# Patient Record
Sex: MALE | Race: OTHER | HISPANIC OR LATINO | Employment: FULL TIME | URBAN - METROPOLITAN AREA
[De-identification: names, ages, dates, MRNs, and addresses within clinical notes are randomized per-mention and may not be internally consistent; named-entity substitution may affect disease eponyms.]

---

## 2017-07-25 ENCOUNTER — OFFICE VISIT (OUTPATIENT)
Dept: MEDICAL GROUP | Facility: MEDICAL CENTER | Age: 37
End: 2017-07-25
Payer: COMMERCIAL

## 2017-07-25 ENCOUNTER — TELEPHONE (OUTPATIENT)
Dept: MEDICAL GROUP | Facility: MEDICAL CENTER | Age: 37
End: 2017-07-25

## 2017-07-25 VITALS
TEMPERATURE: 97.2 F | OXYGEN SATURATION: 96 % | BODY MASS INDEX: 31.44 KG/M2 | HEIGHT: 74 IN | RESPIRATION RATE: 16 BRPM | WEIGHT: 245 LBS | HEART RATE: 74 BPM | SYSTOLIC BLOOD PRESSURE: 136 MMHG | DIASTOLIC BLOOD PRESSURE: 72 MMHG

## 2017-07-25 DIAGNOSIS — E66.9 OBESITY (BMI 30-39.9): ICD-10-CM

## 2017-07-25 DIAGNOSIS — K42.9 UMBILICAL HERNIA WITHOUT OBSTRUCTION AND WITHOUT GANGRENE: ICD-10-CM

## 2017-07-25 DIAGNOSIS — Z00.00 ROUTINE GENERAL MEDICAL EXAMINATION AT A HEALTH CARE FACILITY: ICD-10-CM

## 2017-07-25 DIAGNOSIS — F33.41 RECURRENT MAJOR DEPRESSIVE DISORDER, IN PARTIAL REMISSION (HCC): ICD-10-CM

## 2017-07-25 DIAGNOSIS — E78.5 DYSLIPIDEMIA: ICD-10-CM

## 2017-07-25 DIAGNOSIS — Z11.4 SCREENING FOR HIV (HUMAN IMMUNODEFICIENCY VIRUS): ICD-10-CM

## 2017-07-25 PROCEDURE — 99395 PREV VISIT EST AGE 18-39: CPT | Performed by: NURSE PRACTITIONER

## 2017-07-25 RX ORDER — FENOFIBRATE 120 MG/1
1 TABLET ORAL DAILY
Qty: 30 TAB | Refills: 11 | Status: CANCELLED | OUTPATIENT
Start: 2017-07-25

## 2017-07-25 ASSESSMENT — PATIENT HEALTH QUESTIONNAIRE - PHQ9: CLINICAL INTERPRETATION OF PHQ2 SCORE: 0

## 2017-07-25 ASSESSMENT — ENCOUNTER SYMPTOMS: DEPRESSION: 1

## 2017-07-25 NOTE — MR AVS SNAPSHOT
"        Bertin Dominique   2017 10:40 AM   Office Visit   MRN: [id number]    Department:  [address] [place] [address]   Dept Phone:  [phone]    Description:  Male : 1980   Provider:  LUI Cooley           Reason for Visit     Annual Exam would like to have lab order to check for HIV      Allergies as of 2017     No Known Allergies      You were diagnosed with     Routine general medical examination at a health care facility   [V70.0.ICD-9-CM]       Screening for HIV (human immunodeficiency virus)   [316808]       Dyslipidemia   [880522]       Obesity (BMI 30-39.9)   [997097]       Umbilical hernia without obstruction and without gangrene   [0063886]       Recurrent major depressive disorder, in partial remission (CMS-[facility])   [6752292]         Vital Signs     Blood Pressure Pulse Temperature Respirations Height Weight    136/72 mmHg 74 36.2 °C (97.2 °F) 16 1.88 m (6' 2\") 111.131 kg (245 lb)    Body Mass Index Oxygen Saturation Smoking Status             31.44 kg/m2 96% Current Every Day Smoker         Basic Information     Date Of Birth Sex Race Ethnicity Preferred Language    1980 Male  or   Origin (Kinyarwanda,Norwegian,Bahraini,Edgardo, etc) English      Problem List              ICD-10-CM Priority Class Noted - Resolved    Allergic rhinitis J30.9   2009 - Present    Tobacco use disorder F17.200   2009 - Present    Dyslipidemia E78.5   2011 - Present    Vitamin D deficiency disease E55.9   2013 - Present    Umbilical hernia K42.9   2013 - Present    Obesity (BMI 30-39.9) E66.9   2017 - Present    Recurrent major depressive disorder, in partial remission (CMS-[facility]) F33.41   2017 - Present      Health Maintenance        Date Due Completion Dates    IMM DTaP/Tdap/Td Vaccine (1 - Tdap) 10/11/1997 ---    IMM INFLUENZA (1) 2017, 2013            Current Immunizations     Influenza TIV (IM) 2015, 2013      "   Below and/or attached are the medications your provider expects you to take. Review all of your home medications and newly ordered medications with your provider and/or pharmacist. Follow medication instructions as directed by your provider and/or pharmacist. Please keep your medication list with you and share with your provider. Update the information when medications are discontinued, doses are changed, or new medications (including over-the-counter products) are added; and carry medication information at all times in the event of emergency situations     Allergies:  No Known Allergies          Medications  Valid as of: July 25, 2017 - 11:03 AM    Generic Name Brand Name Tablet Size Instructions for use    Cholecalciferol (Cap) Vitamin D 2000 UNITS Take  by mouth.        Sildenafil Citrate (Tab) VIAGRA 50 MG Take 1 Tab by mouth as needed for Erectile Dysfunction.        .                 Medicines prescribed today were sent to:     [facility] [address] [place] [address], [place] - [address] [facility]    [address] [facility] [address]    Phone: [phone] Fax: [phone]    Open 24 Hours?: No      Medication refill instructions:       If your prescription bottle indicates you have medication refills left, it is not necessary to call your provider’s office. Please contact your pharmacy and they will refill your medication.    If your prescription bottle indicates you do not have any refills left, you may request refills at any time through one of the following ways: The online [employer] system (except Urgent Care), by calling your provider’s office, or by asking your pharmacy to contact your provider’s office with a refill request. Medication refills are processed only during regular business hours and may not be available until the next business day. Your provider may request additional information or to have a follow-up visit with you prior to refilling your medication.   *Please Note: Medication refills  are assigned a new Rx number when refilled electronically. Your pharmacy may indicate that no refills were authorized even though a new prescription for the same medication is available at the pharmacy. Please request the medicine by name with the pharmacy before contacting your provider for a refill.        Your To Do List     Future Labs/Procedures Complete By Expires    COMP METABOLIC PANEL  As directed 7/26/2018    HIV ANTIBODIES  As directed 7/25/2018    LIPID PROFILE  As directed 7/26/2018      Referral     A referral request has been sent to our patient care coordination department. Please allow 3-5 business days for us to process this request and contact you either by phone or mail. If you do not hear from us by the 5th business day, please call us at [phone].           [facility] Access Code: Activation code not generated  Current [facility] Status: Active          Quit Tobacco Information     Do you want to quit using tobacco?    Quitting tobacco decreases risks of cancer, heart and lung disease, increases life expectancy, improves sense of taste and smell, and increases spending money, among other benefits.    If you are thinking about quitting, we can help.  • [facility] Quit Tobacco Program: 733.502.5752  o Program occurs weekly for four weeks and includes pharmacist consultation on products to support quitting smoking or chewing tobacco. A provider referral is needed for pharmacist consultation.  • Tobacco Users Help Hotline: 8-221-QUIT-NOW (080-6981) or https://[place].quitlogix.org/  o Free, confidential telephone and online coaching for [place] residents. Sessions are designed on a schedule that is convenient for you. Eligible clients receive free nicotine replacement therapy.  • Nationally: www.smokefree.gov  o Information and professional assistance to support both immediate and long-term needs as you become, and remain, a non-smoker. Smokefree.gov allows you to choose the help that best fits your  needs.
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
## 2017-07-27 ENCOUNTER — HOSPITAL ENCOUNTER (OUTPATIENT)
Dept: LAB | Facility: MEDICAL CENTER | Age: 37
End: 2017-07-27
Attending: NURSE PRACTITIONER
Payer: COMMERCIAL

## 2017-07-27 DIAGNOSIS — E78.5 DYSLIPIDEMIA: ICD-10-CM

## 2017-07-27 DIAGNOSIS — Z00.00 ROUTINE GENERAL MEDICAL EXAMINATION AT A HEALTH CARE FACILITY: ICD-10-CM

## 2017-07-27 DIAGNOSIS — Z11.4 SCREENING FOR HIV (HUMAN IMMUNODEFICIENCY VIRUS): ICD-10-CM

## 2017-07-27 LAB
ALBUMIN SERPL BCP-MCNC: 4 G/DL (ref 3.2–4.9)
ALBUMIN/GLOB SERPL: 1.3 G/DL
ALP SERPL-CCNC: 79 U/L (ref 30–99)
ALT SERPL-CCNC: 28 U/L (ref 2–50)
ANION GAP SERPL CALC-SCNC: 9 MMOL/L (ref 0–11.9)
AST SERPL-CCNC: 22 U/L (ref 12–45)
BILIRUB SERPL-MCNC: 0.4 MG/DL (ref 0.1–1.5)
BUN SERPL-MCNC: 21 MG/DL (ref 8–22)
CALCIUM SERPL-MCNC: 9 MG/DL (ref 8.5–10.5)
CHLORIDE SERPL-SCNC: 105 MMOL/L (ref 96–112)
CHOLEST SERPL-MCNC: 157 MG/DL (ref 100–199)
CO2 SERPL-SCNC: 26 MMOL/L (ref 20–33)
CREAT SERPL-MCNC: 0.97 MG/DL (ref 0.5–1.4)
GFR SERPL CREATININE-BSD FRML MDRD: >60 ML/MIN/1.73 M 2
GLOBULIN SER CALC-MCNC: 3.1 G/DL (ref 1.9–3.5)
GLUCOSE SERPL-MCNC: 92 MG/DL (ref 65–99)
HDLC SERPL-MCNC: 33 MG/DL
HIV 1+2 AB+HIV1 P24 AG SERPL QL IA: NON REACTIVE
LDLC SERPL CALC-MCNC: ABNORMAL MG/DL
POTASSIUM SERPL-SCNC: 3.5 MMOL/L (ref 3.6–5.5)
PROT SERPL-MCNC: 7.1 G/DL (ref 6–8.2)
SODIUM SERPL-SCNC: 140 MMOL/L (ref 135–145)
TRIGL SERPL-MCNC: 581 MG/DL (ref 0–149)

## 2017-07-27 PROCEDURE — 80053 COMPREHEN METABOLIC PANEL: CPT

## 2017-07-27 PROCEDURE — 36415 COLL VENOUS BLD VENIPUNCTURE: CPT

## 2017-07-27 PROCEDURE — 80061 LIPID PANEL: CPT

## 2017-07-27 PROCEDURE — 87389 HIV-1 AG W/HIV-1&-2 AB AG IA: CPT

## 2018-06-08 ENCOUNTER — OFFICE VISIT (OUTPATIENT)
Dept: MEDICAL GROUP | Facility: MEDICAL CENTER | Age: 38
End: 2018-06-08
Payer: OTHER MISCELLANEOUS

## 2018-06-08 VITALS
HEART RATE: 82 BPM | SYSTOLIC BLOOD PRESSURE: 118 MMHG | HEIGHT: 74 IN | BODY MASS INDEX: 33.37 KG/M2 | RESPIRATION RATE: 16 BRPM | DIASTOLIC BLOOD PRESSURE: 74 MMHG | OXYGEN SATURATION: 96 % | WEIGHT: 260 LBS

## 2018-06-08 DIAGNOSIS — E78.5 DYSLIPIDEMIA: ICD-10-CM

## 2018-06-08 DIAGNOSIS — Z11.4 SCREENING FOR HIV (HUMAN IMMUNODEFICIENCY VIRUS): ICD-10-CM

## 2018-06-08 DIAGNOSIS — Z00.00 ROUTINE GENERAL MEDICAL EXAMINATION AT A HEALTH CARE FACILITY: ICD-10-CM

## 2018-06-08 PROCEDURE — 99213 OFFICE O/P EST LOW 20 MIN: CPT | Performed by: NURSE PRACTITIONER

## 2018-06-08 RX ORDER — ATORVASTATIN CALCIUM 20 MG/1
20 TABLET, FILM COATED ORAL DAILY
Qty: 30 TAB | Refills: 11 | Status: SHIPPED | OUTPATIENT
Start: 2018-06-08 | End: 2019-03-05
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
## 2018-08-13 ENCOUNTER — NON-PROVIDER VISIT (OUTPATIENT)
Dept: URGENT CARE | Facility: PHYSICIAN GROUP | Age: 38
End: 2018-08-13

## 2018-08-13 DIAGNOSIS — Z02.1 PRE-EMPLOYMENT DRUG SCREENING: ICD-10-CM

## 2018-08-13 LAB
AMP AMPHETAMINE: NORMAL
COC COCAINE: NORMAL
INT CON NEG: NORMAL
INT CON POS: NORMAL
MET METHAMPHETAMINES: NORMAL
OPI OPIATES: NORMAL
PCP PHENCYCLIDINE: NORMAL
POC DRUG COMMENT 753798-OCCUPATIONAL HEALTH: NEGATIVE
THC: NORMAL

## 2018-08-13 PROCEDURE — 80305 DRUG TEST PRSMV DIR OPT OBS: CPT | Performed by: PHYSICIAN ASSISTANT

## 2018-11-05 ENCOUNTER — OFFICE VISIT (OUTPATIENT)
Dept: MEDICAL GROUP | Facility: MEDICAL CENTER | Age: 38
End: 2018-11-05
Payer: OTHER MISCELLANEOUS

## 2018-11-05 VITALS
HEART RATE: 71 BPM | DIASTOLIC BLOOD PRESSURE: 70 MMHG | WEIGHT: 262 LBS | OXYGEN SATURATION: 96 % | TEMPERATURE: 97.8 F | SYSTOLIC BLOOD PRESSURE: 142 MMHG | RESPIRATION RATE: 16 BRPM | BODY MASS INDEX: 33.62 KG/M2 | HEIGHT: 74 IN

## 2018-11-05 DIAGNOSIS — M54.50 ACUTE RIGHT-SIDED LOW BACK PAIN WITHOUT SCIATICA: ICD-10-CM

## 2018-11-05 DIAGNOSIS — E78.5 DYSLIPIDEMIA: ICD-10-CM

## 2018-11-05 PROCEDURE — 99214 OFFICE O/P EST MOD 30 MIN: CPT | Performed by: NURSE PRACTITIONER

## 2018-11-05 RX ORDER — DICLOFENAC SODIUM 75 MG/1
75 TABLET, DELAYED RELEASE ORAL 2 TIMES DAILY
Qty: 60 TAB | Refills: 0 | Status: SHIPPED | OUTPATIENT
Start: 2018-11-05 | End: 2019-03-05

## 2018-11-05 RX ORDER — TIZANIDINE 4 MG/1
4 TABLET ORAL 2 TIMES DAILY
Qty: 60 TAB | Refills: 0 | Status: SHIPPED | OUTPATIENT
Start: 2018-11-05 | End: 2019-03-05

## 2018-11-05 ASSESSMENT — ENCOUNTER SYMPTOMS: BACK PAIN: 1
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
## 2019-03-01 ENCOUNTER — HOSPITAL ENCOUNTER (OUTPATIENT)
Dept: LAB | Facility: MEDICAL CENTER | Age: 39
End: 2019-03-01
Attending: NURSE PRACTITIONER
Payer: COMMERCIAL

## 2019-03-01 DIAGNOSIS — Z00.00 ROUTINE GENERAL MEDICAL EXAMINATION AT A HEALTH CARE FACILITY: ICD-10-CM

## 2019-03-01 DIAGNOSIS — Z11.4 SCREENING FOR HIV (HUMAN IMMUNODEFICIENCY VIRUS): ICD-10-CM

## 2019-03-01 LAB
ALBUMIN SERPL BCP-MCNC: 4.1 G/DL (ref 3.2–4.9)
ALBUMIN/GLOB SERPL: 1.4 G/DL
ALP SERPL-CCNC: 67 U/L (ref 30–99)
ALT SERPL-CCNC: 33 U/L (ref 2–50)
ANION GAP SERPL CALC-SCNC: 7 MMOL/L (ref 0–11.9)
AST SERPL-CCNC: 25 U/L (ref 12–45)
BASOPHILS # BLD AUTO: 0.8 % (ref 0–1.8)
BASOPHILS # BLD: 0.05 K/UL (ref 0–0.12)
BILIRUB SERPL-MCNC: 0.8 MG/DL (ref 0.1–1.5)
BUN SERPL-MCNC: 23 MG/DL (ref 8–22)
CALCIUM SERPL-MCNC: 9 MG/DL (ref 8.5–10.5)
CHLORIDE SERPL-SCNC: 105 MMOL/L (ref 96–112)
CHOLEST SERPL-MCNC: 170 MG/DL (ref 100–199)
CO2 SERPL-SCNC: 28 MMOL/L (ref 20–33)
CREAT SERPL-MCNC: 1.12 MG/DL (ref 0.5–1.4)
EOSINOPHIL # BLD AUTO: 0.16 K/UL (ref 0–0.51)
EOSINOPHIL NFR BLD: 2.4 % (ref 0–6.9)
ERYTHROCYTE [DISTWIDTH] IN BLOOD BY AUTOMATED COUNT: 41.4 FL (ref 35.9–50)
FASTING STATUS PATIENT QL REPORTED: NORMAL
GLOBULIN SER CALC-MCNC: 3 G/DL (ref 1.9–3.5)
GLUCOSE SERPL-MCNC: 108 MG/DL (ref 65–99)
HCT VFR BLD AUTO: 49.4 % (ref 42–52)
HDLC SERPL-MCNC: 35 MG/DL
HGB BLD-MCNC: 16.7 G/DL (ref 14–18)
HIV 1+2 AB+HIV1 P24 AG SERPL QL IA: NON REACTIVE
IMM GRANULOCYTES # BLD AUTO: 0.01 K/UL (ref 0–0.11)
IMM GRANULOCYTES NFR BLD AUTO: 0.2 % (ref 0–0.9)
LDLC SERPL CALC-MCNC: ABNORMAL MG/DL
LYMPHOCYTES # BLD AUTO: 2.5 K/UL (ref 1–4.8)
LYMPHOCYTES NFR BLD: 37.6 % (ref 22–41)
MCH RBC QN AUTO: 30.3 PG (ref 27–33)
MCHC RBC AUTO-ENTMCNC: 33.8 G/DL (ref 33.7–35.3)
MCV RBC AUTO: 89.5 FL (ref 81.4–97.8)
MONOCYTES # BLD AUTO: 0.57 K/UL (ref 0–0.85)
MONOCYTES NFR BLD AUTO: 8.6 % (ref 0–13.4)
NEUTROPHILS # BLD AUTO: 3.36 K/UL (ref 1.82–7.42)
NEUTROPHILS NFR BLD: 50.4 % (ref 44–72)
NRBC # BLD AUTO: 0 K/UL
NRBC BLD-RTO: 0 /100 WBC
PLATELET # BLD AUTO: 211 K/UL (ref 164–446)
PMV BLD AUTO: 10.5 FL (ref 9–12.9)
POTASSIUM SERPL-SCNC: 3.9 MMOL/L (ref 3.6–5.5)
PROT SERPL-MCNC: 7.1 G/DL (ref 6–8.2)
RBC # BLD AUTO: 5.52 M/UL (ref 4.7–6.1)
SODIUM SERPL-SCNC: 140 MMOL/L (ref 135–145)
TRIGL SERPL-MCNC: 579 MG/DL (ref 0–149)
TSH SERPL DL<=0.005 MIU/L-ACNC: 2.04 UIU/ML (ref 0.38–5.33)
WBC # BLD AUTO: 6.7 K/UL (ref 4.8–10.8)

## 2019-03-01 PROCEDURE — 85025 COMPLETE CBC W/AUTO DIFF WBC: CPT

## 2019-03-01 PROCEDURE — 36415 COLL VENOUS BLD VENIPUNCTURE: CPT

## 2019-03-01 PROCEDURE — 80061 LIPID PANEL: CPT

## 2019-03-01 PROCEDURE — 84443 ASSAY THYROID STIM HORMONE: CPT

## 2019-03-01 PROCEDURE — 87389 HIV-1 AG W/HIV-1&-2 AB AG IA: CPT

## 2019-03-01 PROCEDURE — 80053 COMPREHEN METABOLIC PANEL: CPT

## 2019-03-05 ENCOUNTER — OFFICE VISIT (OUTPATIENT)
Dept: MEDICAL GROUP | Facility: MEDICAL CENTER | Age: 39
End: 2019-03-05
Payer: COMMERCIAL

## 2019-03-05 VITALS
BODY MASS INDEX: 33.75 KG/M2 | SYSTOLIC BLOOD PRESSURE: 152 MMHG | WEIGHT: 263 LBS | HEIGHT: 74 IN | TEMPERATURE: 97.3 F | OXYGEN SATURATION: 95 % | DIASTOLIC BLOOD PRESSURE: 86 MMHG | RESPIRATION RATE: 16 BRPM | HEART RATE: 72 BPM

## 2019-03-05 DIAGNOSIS — F33.41 RECURRENT MAJOR DEPRESSIVE DISORDER, IN PARTIAL REMISSION (HCC): ICD-10-CM

## 2019-03-05 DIAGNOSIS — R06.83 SNORING: ICD-10-CM

## 2019-03-05 DIAGNOSIS — I10 ESSENTIAL HYPERTENSION: ICD-10-CM

## 2019-03-05 DIAGNOSIS — E78.5 DYSLIPIDEMIA: ICD-10-CM

## 2019-03-05 DIAGNOSIS — R73.01 IMPAIRED FASTING GLUCOSE: ICD-10-CM

## 2019-03-05 DIAGNOSIS — Z23 NEED FOR TDAP VACCINATION: ICD-10-CM

## 2019-03-05 DIAGNOSIS — K21.9 GASTROESOPHAGEAL REFLUX DISEASE WITHOUT ESOPHAGITIS: ICD-10-CM

## 2019-03-05 LAB
HBA1C MFR BLD: 5.8 % (ref ?–5.8)
INT CON NEG: NEGATIVE
INT CON POS: POSITIVE

## 2019-03-05 PROCEDURE — 83036 HEMOGLOBIN GLYCOSYLATED A1C: CPT | Performed by: NURSE PRACTITIONER

## 2019-03-05 PROCEDURE — 90715 TDAP VACCINE 7 YRS/> IM: CPT | Performed by: NURSE PRACTITIONER

## 2019-03-05 PROCEDURE — 90471 IMMUNIZATION ADMIN: CPT | Performed by: NURSE PRACTITIONER

## 2019-03-05 PROCEDURE — 99214 OFFICE O/P EST MOD 30 MIN: CPT | Mod: 25 | Performed by: NURSE PRACTITIONER

## 2019-03-05 RX ORDER — ATORVASTATIN CALCIUM 40 MG/1
40 TABLET, FILM COATED ORAL DAILY
Qty: 30 TAB | Refills: 11 | Status: SHIPPED | OUTPATIENT
Start: 2019-03-05 | End: 2020-03-18 | Stop reason: SDUPTHER

## 2019-03-05 RX ORDER — OMEPRAZOLE 20 MG/1
20 CAPSULE, DELAYED RELEASE ORAL DAILY
Qty: 30 CAP | Refills: 0 | Status: SHIPPED | OUTPATIENT
Start: 2019-03-05 | End: 2021-01-08

## 2019-03-05 ASSESSMENT — ENCOUNTER SYMPTOMS
INSOMNIA: 1
DEPRESSION: 1
HEARTBURN: 1
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
## 2019-07-10 DIAGNOSIS — I10 ESSENTIAL HYPERTENSION: ICD-10-CM
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
## 2019-07-29 ENCOUNTER — SLEEP CENTER VISIT (OUTPATIENT)
Dept: SLEEP MEDICINE | Facility: MEDICAL CENTER | Age: 39
End: 2019-07-29
Payer: COMMERCIAL

## 2019-07-29 VITALS
RESPIRATION RATE: 16 BRPM | WEIGHT: 259 LBS | BODY MASS INDEX: 33.24 KG/M2 | DIASTOLIC BLOOD PRESSURE: 80 MMHG | HEART RATE: 73 BPM | SYSTOLIC BLOOD PRESSURE: 118 MMHG | OXYGEN SATURATION: 94 % | HEIGHT: 74 IN

## 2019-07-29 DIAGNOSIS — F17.200 TOBACCO USE DISORDER: ICD-10-CM

## 2019-07-29 DIAGNOSIS — R73.01 IMPAIRED FASTING GLUCOSE: ICD-10-CM

## 2019-07-29 DIAGNOSIS — E66.9 OBESITY (BMI 30-39.9): ICD-10-CM

## 2019-07-29 DIAGNOSIS — K21.9 GASTROESOPHAGEAL REFLUX DISEASE, ESOPHAGITIS PRESENCE NOT SPECIFIED: ICD-10-CM

## 2019-07-29 DIAGNOSIS — G47.33 OSA (OBSTRUCTIVE SLEEP APNEA): ICD-10-CM

## 2019-07-29 DIAGNOSIS — E78.5 DYSLIPIDEMIA: ICD-10-CM

## 2019-07-29 DIAGNOSIS — R06.83 SNORING: ICD-10-CM

## 2019-07-29 DIAGNOSIS — I10 HYPERTENSION, UNSPECIFIED TYPE: ICD-10-CM

## 2019-07-29 PROCEDURE — 99204 OFFICE O/P NEW MOD 45 MIN: CPT | Performed by: INTERNAL MEDICINE

## 2019-07-29 RX ORDER — ATORVASTATIN CALCIUM 20 MG/1
TABLET, FILM COATED ORAL
Refills: 11 | COMMUNITY
Start: 2019-05-09 | End: 2019-07-28

## 2019-07-29 RX ORDER — CHLORHEXIDINE GLUCONATE ORAL RINSE 1.2 MG/ML
SOLUTION DENTAL
Refills: 2 | COMMUNITY
Start: 2019-06-11 | End: 2019-07-28

## 2019-07-29 RX ORDER — ZOLPIDEM TARTRATE 5 MG/1
5 TABLET ORAL NIGHTLY PRN
Qty: 3 TAB | Refills: 0 | Status: SHIPPED | OUTPATIENT
Start: 2019-07-29 | End: 2019-08-29

## 2019-08-29 ENCOUNTER — SLEEP STUDY (OUTPATIENT)
Dept: SLEEP MEDICINE | Facility: MEDICAL CENTER | Age: 39
End: 2019-08-29
Attending: INTERNAL MEDICINE
Payer: COMMERCIAL

## 2019-08-29 DIAGNOSIS — G47.33 OSA (OBSTRUCTIVE SLEEP APNEA): ICD-10-CM

## 2019-08-29 PROCEDURE — 95811 POLYSOM 6/>YRS CPAP 4/> PARM: CPT | Performed by: FAMILY MEDICINE

## 2019-08-30 NOTE — PROCEDURES
Technical summary: The patient underwent a split-night polysomnogram. This was a 16 channel montage study to include a 6 channel EEG, a 2 channel EOG, and chin EMG, left and right leg EMG, a snore channel, a nasal pressure transducer, and a nasal oral airflow  thermistor and a CFLOW pressure transducer.   Respiratory effort was assessed with the use of a thoracic and abdominal monitor and overnight oximetry was obtained. Audio and video recordings were reviewed. This was a fully attended study and sleep stage scoring was performed. The test was technically adequate.    Scoring Criteria: A modification of the the AASM Manual for the Scoring of Sleep and Associated Events, 2012, was used.   Obstructive apnea was scored by cessation of airflow for at least 10 seconds with continuing respiratory effort.  Central apnea was scored by cessation of airflow for at least 10 seconds with no effort.  Hypopnea was scored by a 30% or more reduction in airflow for at least 10 seconds accompanied by an arterial oxygen desaturation of 3% or more.  (For Medicare patients, hypopneas were scored by a 30% or more reduction in airflow for at least 10 seconds accompanied by an arterial oxygen saturation of 4% or more, as required by their insurance, CMS.    Interpretation:  Study start time was 07:57:45 PM.  Total recording time was 6h 9.0m (369 minutes) with a total sleep time of 3h 22.5m (202 minutes) resulting in a sleep efficiency of 54.88%.  Sleep latency from the start fo the study was 88 minutes minutes and REM latency from sleep onset was 136 minutes minutes. Sleep stages showed increased SL, decreased SE, decreased REM, no N3 sleep and increased WASO of 78 min.    Respiratory:   There were 0 apneas in total consisting of 0 obstructive apneas, 0 mixed apneas, and 0 central apneas.  There were 51 hypopneas in total.The apnea index was 0.00 per hour and the hypopnea index was 15.11 per hour.The overall AHI was 15.1, with a REM AHI  of 8.57, and a supine AHI of 18.77.    Limb Movements:  There were a total of 0 periodic leg movements, of which 0 were PLMS arousals.  This resulted in a PLMS index of 0.0 and a PLMS arousal index of 0.0  Oximetry:  The mean SaO2 was 91.0% for the diagnostic portion of the study, with a minimum SaO2 of 83.0%.    Treatment:  Interpretation:  Treatment recording time was 3h 43.5m (223 minutes) with a total sleep time of 3h 27.0m (207 minutes) resulting in a sleep efficiency of 92.6%.    Sleep latency from the start of treatment was 03 minutes minutes and REM latency from sleep onset was 0h 44.0m minutes.  The patient had 41 arousals in total for an arousal index of 11.9. Sleep stages showed improved SE, normal REM, no N3 sleep and normal WASO of 13 min.    Respiratory:   There were 0 apneas in total consisting of  0 obstructive apneas, 0 central apneas, and 0 mixed apneas for an apnea index of 0.00.  The patient had 13 hypopneas in total, which resulted in a hypopnea index of 3.77.  The overall AHI was 3.77, with a REM AHI of 7.23, and a supine AHI of 3.99.       Limb Movements:  There were a total of 16 periodic leg movements, of which 3 were PLMS arousals.  This resulted in a PLMS index of 4.6 and a PLMS arousal index of 0.9.    Oximetry:  The mean SaO2 during treatment was 92.0%, with a minimum oxygen saturation of 86.0%.     CPAP was tried from 5 to 9cm H2O.    CPAP Titration:  Due to the significant number of obstructive respiratory events observed during the diagnostic portion of the study a CPAP titration trial was performed during the second half of the night. The CPAP pressure was initiated at 5 cm of water and the pressure was increased in an attempt to eliminate all sleep disordered breathing and snoring. The CPAP pressure was increased to CPAP 9 cm water and at this final pressure the patient was observed in the supine position and in the REM sleep stage. The apnea hypopnea index improved to 3.09/hr with  improved O2 sara of  86%. He spent 0.4 min of sleep time below 89% O2 saturation Snoring was resolved. The patient utilized medium simplus mask with heated humidification. The CPAP was well-tolerated and there was minimal air leaks. No supplemental oxygen was required.    Impression:  1.  Moderate obstructive sleep apnea with AHI of 15.1/hr and O2 sara 85 %. Due to severity of the disease he met the split study protocol. The titration started with CPAP 5 cm and the best tolerated was CPAP 9 cm. The AHI improved to 3.09/hr with improved O2 sara of 86% and average O2 saturation of 93 %.       Recommendations:  I recommend CPAP 9 cm with simplus mask. I also recommend 30 day compliance download to assess the efficacy to the recommended pressure, measure leak, apnea hypopnea index and compliance for further outpatient monitoring and management of CPAP therapy. In some cases alternative treatment options may prove effective in resolving sleep apnea and these options include upper airway surgery, the use of a dental orthotic or weight loss and positional therapy. Clinical correlation is required. In general patients with sleep apnea are advised to avoid alcohol and sedatives and to not operate a motor vehicle while drowsy and are at a greater risk for cardiovascular disease.

## 2019-09-18 ENCOUNTER — SLEEP CENTER VISIT (OUTPATIENT)
Dept: SLEEP MEDICINE | Facility: MEDICAL CENTER | Age: 39
End: 2019-09-18
Payer: COMMERCIAL

## 2019-09-18 VITALS
BODY MASS INDEX: 32.73 KG/M2 | WEIGHT: 255 LBS | DIASTOLIC BLOOD PRESSURE: 64 MMHG | HEIGHT: 74 IN | OXYGEN SATURATION: 95 % | SYSTOLIC BLOOD PRESSURE: 122 MMHG | HEART RATE: 59 BPM

## 2019-09-18 DIAGNOSIS — G47.33 OSA (OBSTRUCTIVE SLEEP APNEA): ICD-10-CM

## 2019-09-18 DIAGNOSIS — E66.9 OBESITY (BMI 30-39.9): ICD-10-CM

## 2019-09-18 PROCEDURE — 99214 OFFICE O/P EST MOD 30 MIN: CPT | Performed by: NURSE PRACTITIONER
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
## 2019-09-18 NOTE — PATIENT INSTRUCTIONS
1) Start CPAP at 9cmH20  2) Discussed acclimating to machine and change mask within first 30 days if required. Bring machine to first appointment.  3) Continue Light conditioning and dietary changes  4) Continue smoking cessation   4) Vaccines: Up to date with Pneumovax 23  5) Return in about 2 months (around 11/18/2019) for if not sooner, Compliance.

## 2019-10-11 ENCOUNTER — TELEPHONE (OUTPATIENT)
Dept: SLEEP MEDICINE | Facility: MEDICAL CENTER | Age: 39
End: 2019-10-11

## 2019-10-11 NOTE — TELEPHONE ENCOUNTER
Patient called requesting updated on CPAP order. Patient states he has been having a difficult time getting an update from [facility].     Called and spoke with Addy, she informed me that patients order was faxed to [facility] in [place]. Spoke with someone at [facility] in [place] and they informed me that they are pending patients mask , unknown when patients order will be ready.     Patient was upset that no one at [facility] knew what was going on until I called for him.     Patient has been provided with contact information for [facility]

## 2020-03-18 ENCOUNTER — OFFICE VISIT (OUTPATIENT)
Dept: MEDICAL GROUP | Facility: MEDICAL CENTER | Age: 40
End: 2020-03-18
Payer: COMMERCIAL

## 2020-03-18 VITALS
OXYGEN SATURATION: 95 % | BODY MASS INDEX: 34.54 KG/M2 | RESPIRATION RATE: 16 BRPM | SYSTOLIC BLOOD PRESSURE: 134 MMHG | WEIGHT: 269 LBS | DIASTOLIC BLOOD PRESSURE: 66 MMHG | HEART RATE: 82 BPM

## 2020-03-18 DIAGNOSIS — N50.819 TESTICULAR PAIN: ICD-10-CM

## 2020-03-18 DIAGNOSIS — E78.5 DYSLIPIDEMIA: ICD-10-CM

## 2020-03-18 DIAGNOSIS — R73.01 IMPAIRED FASTING GLUCOSE: ICD-10-CM

## 2020-03-18 DIAGNOSIS — Z11.4 SCREENING FOR HIV (HUMAN IMMUNODEFICIENCY VIRUS): ICD-10-CM

## 2020-03-18 DIAGNOSIS — Z00.00 ROUTINE GENERAL MEDICAL EXAMINATION AT A HEALTH CARE FACILITY: ICD-10-CM

## 2020-03-18 DIAGNOSIS — I10 ESSENTIAL HYPERTENSION: ICD-10-CM

## 2020-03-18 PROCEDURE — 99396 PREV VISIT EST AGE 40-64: CPT | Performed by: NURSE PRACTITIONER

## 2020-03-18 RX ORDER — ATORVASTATIN CALCIUM 40 MG/1
40 TABLET, FILM COATED ORAL DAILY
Qty: 90 TAB | Refills: 3 | Status: SHIPPED | OUTPATIENT
Start: 2020-03-18 | End: 2021-01-08 | Stop reason: SDUPTHER

## 2020-03-18 ASSESSMENT — FIBROSIS 4 INDEX: FIB4 SCORE: 0.85
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
## 2020-03-26 ENCOUNTER — HOSPITAL ENCOUNTER (OUTPATIENT)
Dept: LAB | Facility: MEDICAL CENTER | Age: 40
End: 2020-03-26
Attending: NURSE PRACTITIONER
Payer: COMMERCIAL

## 2020-03-26 DIAGNOSIS — Z00.00 ROUTINE GENERAL MEDICAL EXAMINATION AT A HEALTH CARE FACILITY: ICD-10-CM

## 2020-03-26 DIAGNOSIS — Z11.4 SCREENING FOR HIV (HUMAN IMMUNODEFICIENCY VIRUS): ICD-10-CM

## 2020-03-26 DIAGNOSIS — R73.01 IMPAIRED FASTING GLUCOSE: ICD-10-CM

## 2020-03-26 DIAGNOSIS — E78.5 DYSLIPIDEMIA: ICD-10-CM

## 2020-03-26 LAB
ALBUMIN SERPL BCP-MCNC: 4.3 G/DL (ref 3.2–4.9)
ALBUMIN/GLOB SERPL: 1.5 G/DL
ALP SERPL-CCNC: 85 U/L (ref 30–99)
ALT SERPL-CCNC: 32 U/L (ref 2–50)
ANION GAP SERPL CALC-SCNC: 11 MMOL/L (ref 7–16)
AST SERPL-CCNC: 24 U/L (ref 12–45)
BILIRUB SERPL-MCNC: 0.6 MG/DL (ref 0.1–1.5)
BUN SERPL-MCNC: 16 MG/DL (ref 8–22)
CALCIUM SERPL-MCNC: 8.9 MG/DL (ref 8.5–10.5)
CHLORIDE SERPL-SCNC: 103 MMOL/L (ref 96–112)
CHOLEST SERPL-MCNC: 146 MG/DL (ref 100–199)
CO2 SERPL-SCNC: 26 MMOL/L (ref 20–33)
CREAT SERPL-MCNC: 1.06 MG/DL (ref 0.5–1.4)
ERYTHROCYTE [DISTWIDTH] IN BLOOD BY AUTOMATED COUNT: 41.1 FL (ref 35.9–50)
EST. AVERAGE GLUCOSE BLD GHB EST-MCNC: 126 MG/DL
FASTING STATUS PATIENT QL REPORTED: NORMAL
GLOBULIN SER CALC-MCNC: 2.9 G/DL (ref 1.9–3.5)
GLUCOSE SERPL-MCNC: 112 MG/DL (ref 65–99)
HBA1C MFR BLD: 6 % (ref 0–5.6)
HCT VFR BLD AUTO: 45.5 % (ref 42–52)
HDLC SERPL-MCNC: 36 MG/DL
HGB BLD-MCNC: 15.8 G/DL (ref 14–18)
HIV 1+2 AB+HIV1 P24 AG SERPL QL IA: NORMAL
LDLC SERPL CALC-MCNC: 43 MG/DL
MCH RBC QN AUTO: 30.8 PG (ref 27–33)
MCHC RBC AUTO-ENTMCNC: 34.7 G/DL (ref 33.7–35.3)
MCV RBC AUTO: 88.7 FL (ref 81.4–97.8)
PLATELET # BLD AUTO: 202 K/UL (ref 164–446)
PMV BLD AUTO: 10.6 FL (ref 9–12.9)
POTASSIUM SERPL-SCNC: 4 MMOL/L (ref 3.6–5.5)
PROT SERPL-MCNC: 7.2 G/DL (ref 6–8.2)
RBC # BLD AUTO: 5.13 M/UL (ref 4.7–6.1)
SODIUM SERPL-SCNC: 140 MMOL/L (ref 135–145)
TRIGL SERPL-MCNC: 335 MG/DL (ref 0–149)
WBC # BLD AUTO: 6.6 K/UL (ref 4.8–10.8)

## 2020-03-26 PROCEDURE — 87389 HIV-1 AG W/HIV-1&-2 AB AG IA: CPT

## 2020-03-26 PROCEDURE — 80053 COMPREHEN METABOLIC PANEL: CPT

## 2020-03-26 PROCEDURE — 80061 LIPID PANEL: CPT

## 2020-03-26 PROCEDURE — 83036 HEMOGLOBIN GLYCOSYLATED A1C: CPT

## 2020-03-26 PROCEDURE — 36415 COLL VENOUS BLD VENIPUNCTURE: CPT

## 2020-03-26 PROCEDURE — 85027 COMPLETE CBC AUTOMATED: CPT

## 2020-10-27 PROCEDURE — 99282 EMERGENCY DEPT VISIT SF MDM: CPT

## 2020-10-27 ASSESSMENT — FIBROSIS 4 INDEX: FIB4 SCORE: 0.88

## 2020-10-28 ENCOUNTER — HOSPITAL ENCOUNTER (EMERGENCY)
Facility: MEDICAL CENTER | Age: 40
End: 2020-10-28
Attending: EMERGENCY MEDICINE
Payer: COMMERCIAL

## 2020-10-28 VITALS
HEIGHT: 74 IN | RESPIRATION RATE: 17 BRPM | OXYGEN SATURATION: 96 % | HEART RATE: 79 BPM | BODY MASS INDEX: 34.63 KG/M2 | WEIGHT: 269.84 LBS | TEMPERATURE: 98.7 F | DIASTOLIC BLOOD PRESSURE: 88 MMHG | SYSTOLIC BLOOD PRESSURE: 135 MMHG

## 2020-10-28 DIAGNOSIS — K42.9 UMBILICAL HERNIA WITHOUT OBSTRUCTION AND WITHOUT GANGRENE: ICD-10-CM

## 2020-10-28 NOTE — DISCHARGE INSTRUCTIONS
Las hernias no son peligrosas a menos que se atasquen en naveed posición empujada hacia afuera y no pueda masajearlas suavemente recostándose boca arriba y relajando el estómago. Las hernias aumentan de tamaño con el tiempo, por lo que sería chun discutir la reparación de la suya antes de que se convierta en un problema mayor. Utilice la carpeta abdominal que le proporcionamos y la medicación. Utilice la información de contacto que se proporciona aquí para programar naveed karol con la cirugía para hablar sobre sadler hernia y considerar arreglarla.    (Hernias are not dangerous unless they get stuck in a pushed out position and you can't gently massage them back in by lying on your back and relaxing your stomach. Hernias do get bigger over time, so it would be good to discuss repairing yours before it becomes a bigger problem. Use the abdominal binder we provided, and the medication. Use the contact information provided here to schedule an appointment with surgery to discuss your hernia and consider fixing it.)
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
## 2020-10-28 NOTE — ED NOTES
Bertin Zhu is being discharged from the Emergency Department in stable condition. Discharge and follow up instructions were given to patient.   Prescription was explained to the patient. Bertin Zhu is alert and oriented and verbalizes understanding. VSS. The patient ambulates with steady gait.

## 2020-10-28 NOTE — ED NOTES
Patient vital signs rechecked and documented per [facility]. Patient denies any new needs at this time.  Patient updated on wait times, thanked for patience. Pt informed to alert triage tech or triage RN with any needs and/or changes in condition; patient verbalized understanding.

## 2020-10-28 NOTE — ED TRIAGE NOTES
Chief Complaint   Patient presents with   • Hernia     Pt reports umbilical hernia for yrs. Today he strated having intermittant pain in area, no increase in pain with palpation. Pain level 4/10. Pain increases with beding- feel sbetter when he straightens. Can push easily on area. No other sx.        Vitals:    10/27/20 2059   BP: 151/97   Pulse: 73   Resp: 16   Temp: 36.8 °C (98.2 °F)   SpO2: 93%       Pt amb to triage with steady gait for above complaint.   Pt is alert and oriented, speaking in full sentences, follows commands and responds appropriately to questions. NAD. Resp are even and unlabored.  Pt placed in lobby. Pt educated on triage process. Pt encouraged to alert staff for any changes.

## 2020-11-03 ENCOUNTER — OFFICE VISIT (OUTPATIENT)
Dept: MEDICAL GROUP | Facility: MEDICAL CENTER | Age: 40
End: 2020-11-03
Payer: COMMERCIAL

## 2020-11-03 VITALS
RESPIRATION RATE: 16 BRPM | TEMPERATURE: 97.4 F | HEART RATE: 72 BPM | DIASTOLIC BLOOD PRESSURE: 72 MMHG | SYSTOLIC BLOOD PRESSURE: 130 MMHG | OXYGEN SATURATION: 99 % | WEIGHT: 265 LBS | BODY MASS INDEX: 34.01 KG/M2 | HEIGHT: 74 IN

## 2020-11-03 DIAGNOSIS — E78.5 DYSLIPIDEMIA: ICD-10-CM

## 2020-11-03 DIAGNOSIS — G47.33 OSA (OBSTRUCTIVE SLEEP APNEA): ICD-10-CM

## 2020-11-03 DIAGNOSIS — F17.200 TOBACCO USE DISORDER: ICD-10-CM

## 2020-11-03 DIAGNOSIS — R73.01 IMPAIRED FASTING GLUCOSE: ICD-10-CM

## 2020-11-03 DIAGNOSIS — K42.9 UMBILICAL HERNIA WITHOUT OBSTRUCTION AND WITHOUT GANGRENE: ICD-10-CM

## 2020-11-03 DIAGNOSIS — E66.9 OBESITY (BMI 30-39.9): ICD-10-CM

## 2020-11-03 PROBLEM — F33.41 RECURRENT MAJOR DEPRESSIVE DISORDER, IN PARTIAL REMISSION (HCC): Status: RESOLVED | Noted: 2017-07-25 | Resolved: 2020-11-03

## 2020-11-03 PROBLEM — R06.83 SNORING: Status: RESOLVED | Noted: 2019-07-29 | Resolved: 2020-11-03

## 2020-11-03 PROCEDURE — 99214 OFFICE O/P EST MOD 30 MIN: CPT | Performed by: NURSE PRACTITIONER

## 2020-11-03 ASSESSMENT — FIBROSIS 4 INDEX: FIB4 SCORE: 0.88
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
## 2020-11-06 ENCOUNTER — HOSPITAL ENCOUNTER (OUTPATIENT)
Dept: LAB | Facility: MEDICAL CENTER | Age: 40
End: 2020-11-06
Attending: NURSE PRACTITIONER
Payer: COMMERCIAL

## 2020-11-06 DIAGNOSIS — Z20.822 EXPOSURE TO COVID-19 VIRUS: ICD-10-CM

## 2020-11-06 PROCEDURE — U0003 INFECTIOUS AGENT DETECTION BY NUCLEIC ACID (DNA OR RNA); SEVERE ACUTE RESPIRATORY SYNDROME CORONAVIRUS 2 (SARS-COV-2) (CORONAVIRUS DISEASE [COVID-19]), AMPLIFIED PROBE TECHNIQUE, MAKING USE OF HIGH THROUGHPUT TECHNOLOGIES AS DESCRIBED BY CMS-2020-01-R: HCPCS

## 2020-11-06 PROCEDURE — C9803 HOPD COVID-19 SPEC COLLECT: HCPCS

## 2020-11-07 LAB
COVID ORDER STATUS COVID19: NORMAL
SARS-COV-2 RNA RESP QL NAA+PROBE: DETECTED
SPECIMEN SOURCE: ABNORMAL

## 2021-01-08 ENCOUNTER — OFFICE VISIT (OUTPATIENT)
Dept: MEDICAL GROUP | Facility: MEDICAL CENTER | Age: 41
End: 2021-01-08
Payer: COMMERCIAL

## 2021-01-08 VITALS
HEART RATE: 76 BPM | OXYGEN SATURATION: 95 % | SYSTOLIC BLOOD PRESSURE: 124 MMHG | DIASTOLIC BLOOD PRESSURE: 76 MMHG | HEIGHT: 74 IN | WEIGHT: 264 LBS | BODY MASS INDEX: 33.88 KG/M2 | TEMPERATURE: 97.6 F | RESPIRATION RATE: 16 BRPM

## 2021-01-08 DIAGNOSIS — Z11.4 SCREENING FOR HIV (HUMAN IMMUNODEFICIENCY VIRUS): ICD-10-CM

## 2021-01-08 DIAGNOSIS — R73.01 IMPAIRED FASTING GLUCOSE: ICD-10-CM

## 2021-01-08 DIAGNOSIS — Z86.16 HISTORY OF COVID-19: ICD-10-CM

## 2021-01-08 DIAGNOSIS — I10 ESSENTIAL HYPERTENSION: ICD-10-CM

## 2021-01-08 DIAGNOSIS — H11.31 CONJUNCTIVAL HEMORRHAGE OF RIGHT EYE: ICD-10-CM

## 2021-01-08 DIAGNOSIS — K42.9 UMBILICAL HERNIA WITHOUT OBSTRUCTION AND WITHOUT GANGRENE: ICD-10-CM

## 2021-01-08 DIAGNOSIS — E78.5 DYSLIPIDEMIA: ICD-10-CM

## 2021-01-08 PROCEDURE — 99214 OFFICE O/P EST MOD 30 MIN: CPT | Performed by: NURSE PRACTITIONER

## 2021-01-08 RX ORDER — ATORVASTATIN CALCIUM 40 MG/1
40 TABLET, FILM COATED ORAL DAILY
Qty: 90 TAB | Refills: 3 | Status: SHIPPED | OUTPATIENT
Start: 2021-01-08 | End: 2022-02-07 | Stop reason: SDUPTHER

## 2021-01-08 RX ORDER — CIPROFLOXACIN HYDROCHLORIDE 3.5 MG/ML
1 SOLUTION/ DROPS TOPICAL
Qty: 5 ML | Refills: 0 | Status: SHIPPED | OUTPATIENT
Start: 2021-01-08 | End: 2022-02-14

## 2021-01-08 ASSESSMENT — PATIENT HEALTH QUESTIONNAIRE - PHQ9: CLINICAL INTERPRETATION OF PHQ2 SCORE: 0

## 2021-01-08 ASSESSMENT — ENCOUNTER SYMPTOMS
EYE DISCHARGE: 1
EYE REDNESS: 1

## 2021-01-08 ASSESSMENT — FIBROSIS 4 INDEX: FIB4 SCORE: 0.88
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
## 2021-06-08 ENCOUNTER — HOSPITAL ENCOUNTER (OUTPATIENT)
Dept: LAB | Facility: MEDICAL CENTER | Age: 41
End: 2021-06-08
Attending: NURSE PRACTITIONER
Payer: COMMERCIAL

## 2021-06-08 DIAGNOSIS — Z20.822 EXPOSURE TO COVID-19 VIRUS: ICD-10-CM

## 2021-06-08 DIAGNOSIS — R73.01 IMPAIRED FASTING GLUCOSE: ICD-10-CM

## 2021-06-08 DIAGNOSIS — E78.5 DYSLIPIDEMIA: ICD-10-CM

## 2021-06-08 DIAGNOSIS — Z11.4 SCREENING FOR HIV (HUMAN IMMUNODEFICIENCY VIRUS): ICD-10-CM

## 2021-06-08 DIAGNOSIS — K42.9 UMBILICAL HERNIA WITHOUT OBSTRUCTION AND WITHOUT GANGRENE: ICD-10-CM

## 2021-06-08 PROCEDURE — 87389 HIV-1 AG W/HIV-1&-2 AB AG IA: CPT

## 2021-06-08 PROCEDURE — 83036 HEMOGLOBIN GLYCOSYLATED A1C: CPT

## 2021-06-08 PROCEDURE — 80053 COMPREHEN METABOLIC PANEL: CPT

## 2021-06-08 PROCEDURE — 80061 LIPID PANEL: CPT

## 2021-06-08 PROCEDURE — 36415 COLL VENOUS BLD VENIPUNCTURE: CPT

## 2021-06-09 LAB
ALBUMIN SERPL BCP-MCNC: 4.1 G/DL (ref 3.2–4.9)
ALBUMIN/GLOB SERPL: 1.5 G/DL
ALP SERPL-CCNC: 86 U/L (ref 30–99)
ALT SERPL-CCNC: 28 U/L (ref 2–50)
ANION GAP SERPL CALC-SCNC: 8 MMOL/L (ref 7–16)
AST SERPL-CCNC: 29 U/L (ref 12–45)
BILIRUB SERPL-MCNC: 0.6 MG/DL (ref 0.1–1.5)
BUN SERPL-MCNC: 17 MG/DL (ref 8–22)
CALCIUM SERPL-MCNC: 8.3 MG/DL (ref 8.5–10.5)
CHLORIDE SERPL-SCNC: 108 MMOL/L (ref 96–112)
CHOLEST SERPL-MCNC: 123 MG/DL (ref 100–199)
CO2 SERPL-SCNC: 23 MMOL/L (ref 20–33)
CREAT SERPL-MCNC: 0.94 MG/DL (ref 0.5–1.4)
EST. AVERAGE GLUCOSE BLD GHB EST-MCNC: 120 MG/DL
FASTING STATUS PATIENT QL REPORTED: NORMAL
GLOBULIN SER CALC-MCNC: 2.8 G/DL (ref 1.9–3.5)
GLUCOSE SERPL-MCNC: 102 MG/DL (ref 65–99)
HBA1C MFR BLD: 5.8 % (ref 4–5.6)
HDLC SERPL-MCNC: 33 MG/DL
HIV 1+2 AB+HIV1 P24 AG SERPL QL IA: NORMAL
LDLC SERPL CALC-MCNC: 55 MG/DL
POTASSIUM SERPL-SCNC: 4.1 MMOL/L (ref 3.6–5.5)
PROT SERPL-MCNC: 6.9 G/DL (ref 6–8.2)
SODIUM SERPL-SCNC: 139 MMOL/L (ref 135–145)
TRIGL SERPL-MCNC: 174 MG/DL (ref 0–149)

## 2022-02-07 DIAGNOSIS — E78.5 DYSLIPIDEMIA: ICD-10-CM

## 2022-02-07 DIAGNOSIS — I10 ESSENTIAL HYPERTENSION: ICD-10-CM

## 2022-02-07 RX ORDER — ATORVASTATIN CALCIUM 40 MG/1
40 TABLET, FILM COATED ORAL DAILY
Qty: 90 TABLET | Refills: 0 | Status: SHIPPED | OUTPATIENT
Start: 2022-02-07 | End: 2022-02-20

## 2022-02-07 NOTE — TELEPHONE ENCOUNTER
Received request via: Pharmacy    Was the patient seen in the last year in this department? No upcoming appointment on 04/07/2022    Does the patient have an active prescription (recently filled or refills available) for medication(s) requested? No

## 2022-02-14 ENCOUNTER — OFFICE VISIT (OUTPATIENT)
Dept: MEDICAL GROUP | Facility: PHYSICIAN GROUP | Age: 42
End: 2022-02-14
Payer: COMMERCIAL

## 2022-02-14 VITALS
OXYGEN SATURATION: 97 % | DIASTOLIC BLOOD PRESSURE: 80 MMHG | HEART RATE: 80 BPM | SYSTOLIC BLOOD PRESSURE: 142 MMHG | TEMPERATURE: 97.5 F | WEIGHT: 277.6 LBS | BODY MASS INDEX: 35.63 KG/M2 | HEIGHT: 74 IN | RESPIRATION RATE: 20 BRPM

## 2022-02-14 DIAGNOSIS — Z86.69 HISTORY OF BLURRED VISION: ICD-10-CM

## 2022-02-14 DIAGNOSIS — Z76.89 ENCOUNTER TO ESTABLISH CARE: ICD-10-CM

## 2022-02-14 DIAGNOSIS — E78.5 DYSLIPIDEMIA: ICD-10-CM

## 2022-02-14 DIAGNOSIS — M79.604 LEG PAIN, BILATERAL: ICD-10-CM

## 2022-02-14 DIAGNOSIS — R73.03 PREDIABETES: ICD-10-CM

## 2022-02-14 DIAGNOSIS — R53.83 FATIGUE, UNSPECIFIED TYPE: ICD-10-CM

## 2022-02-14 DIAGNOSIS — R25.2 LEG CRAMPING: ICD-10-CM

## 2022-02-14 DIAGNOSIS — G47.33 OSA (OBSTRUCTIVE SLEEP APNEA): ICD-10-CM

## 2022-02-14 DIAGNOSIS — E55.9 VITAMIN D DEFICIENCY: ICD-10-CM

## 2022-02-14 DIAGNOSIS — Z11.3 SCREEN FOR SEXUALLY TRANSMITTED DISEASES: ICD-10-CM

## 2022-02-14 DIAGNOSIS — K42.9 UMBILICAL HERNIA WITHOUT OBSTRUCTION AND WITHOUT GANGRENE: ICD-10-CM

## 2022-02-14 DIAGNOSIS — F17.200 TOBACCO USE DISORDER: ICD-10-CM

## 2022-02-14 DIAGNOSIS — I10 PRIMARY HYPERTENSION: ICD-10-CM

## 2022-02-14 DIAGNOSIS — M79.605 LEG PAIN, BILATERAL: ICD-10-CM

## 2022-02-14 PROCEDURE — 99214 OFFICE O/P EST MOD 30 MIN: CPT | Performed by: NURSE PRACTITIONER

## 2022-02-14 ASSESSMENT — PAIN SCALES - GENERAL: PAINLEVEL: 8=MODERATE-SEVERE PAIN

## 2022-02-14 ASSESSMENT — FIBROSIS 4 INDEX: FIB4 SCORE: 1.17

## 2022-02-14 NOTE — ASSESSMENT & PLAN NOTE
Reports bilat leg cramps/pain that radiate to feet, often moreso after walking extensively for work; denies numbness; no hx of low potassium

## 2022-02-14 NOTE — ASSESSMENT & PLAN NOTE
Uses CPAP every night; reporting feeling rested in the am; last sleep study was approx 2 years ago

## 2022-02-14 NOTE — ASSESSMENT & PLAN NOTE
Pt was seen in the last few months for blurred vision; this has since resolved, but he does report redness intermittently; thinks it's the left eye; last eye exam was approx 2 years ago; wears glasses

## 2022-02-14 NOTE — PROGRESS NOTES
Chief Complaint   Patient presents with   • Establish Care   • Other     both feet bruning        Subjective:     HPI:   Bertin Zhu is a 41 y.o. male here to [facility] care    Hypertension  Pt has a bp cuff at home; he will measure his bp twice daily x 1 week; if <130/90, f/up w/me in 4-6 weeks for lab results; if >130/90, schedule appt sooner (in next 2 weeks) and bring bp cuff for eval      Umbilical hernia  Reports  Saw specialist about a year ago; recommended surgery, but pt declined because it would've been expensive; stopped lifting heavy items at work; has an abd binder but doesn't wear it; minimal discomfort    Tobacco Use Disorder  Smokes rarely, when he's stressed; is aware of the risks     PHYLICIA (obstructive sleep apnea)  Uses CPAP every night; reporting feeling rested in the am; last sleep study was approx 2 years ago    Dyslipidemia  High TG in recent past; needs labs done in near future     History of blurred vision  Pt was seen in the last few months for blurred vision; this has since resolved, but he does report redness intermittently; thinks it's the left eye; last eye exam was approx 2 years ago; wears glasses    Leg pain, bilateral  Reports bilat leg cramps/pain that radiate to feet, often moreso after walking extensively for work; denies numbness; no hx of low potassium    Leg cramping  See leg pain            No Known Allergies    Current medicines (including changes today)  Current Outpatient Medications   Medication Sig Dispense Refill   • metoprolol tartrate (LOPRESSOR) 25 MG Tab TAKE 1 TABLET BY MOUTH TWICE DAILY 180 Tablet 0   • atorvastatin (LIPITOR) 40 MG Tab Take 1 Tablet by mouth every day. 90 Tablet 0     No current facility-administered medications for this visit.       Social History     Tobacco Use   • Smoking status: Current Some Day Smoker     Types: Cigarettes   • Smokeless tobacco: Never Used   • Tobacco comment: 2 every other day   Vaping Use   • Vaping Use: Never  used   Substance Use Topics   • Alcohol use: Yes     Alcohol/week: 2.4 oz     Types: 4 Cans of beer per week     Comment: occasional   • Drug use: No     Comment: in past he used marijuana,cocaine,methamphetamines and maybe crack, pt. unsure       Patient Active Problem List    Diagnosis Date Noted   • Prediabetes 02/14/2022   • Leg pain, bilateral 02/14/2022   • Encounter to establish care 02/14/2022   • History of blurred vision 02/14/2022   • Leg cramping 02/14/2022   • Hypertension 02/14/2022   • History of COVID-19 01/08/2021   • PHYLICIA (obstructive sleep apnea) 07/29/2019   • Impaired fasting glucose 03/05/2019   • Obesity (BMI 30-39.9) 07/25/2017   • Vitamin D deficiency disease 12/16/2013   • Umbilical hernia 12/16/2013   • Dyslipidemia 11/22/2011   • Tobacco use disorder 11/06/2009       Family History   Problem Relation Age of Onset   • Diabetes Father    • Diabetes Paternal Grandmother    • Diabetes Paternal Grandfather           Objective:     No visits with results within 1 Month(s) from this visit.   Latest known visit with results is:   Hospital Outpatient Visit on 06/08/2021   Component Date Value Ref Range Status   • HIV Ag/Ab Combo Assay 06/08/2021 Non-Reactive  Non Reactive Final    Comment: [employer] HIV is a diagnostic test for the qualitative determination of HIV-1  p24 antigen and antibodies to HIV-1 (HIV-1 groups M and O) and HIV-2 in  human serum and plasma. A negative screen does not preclude the possibility  of exposure or infection. Consider retesting in high-risk patients, if  clinically indicated.     • Glycohemoglobin 06/08/2021 5.8* 4.0 - 5.6 % Final    Comment: Increased risk for diabetes:  5.7 -6.4%  Diabetes:  >6.4%  Glycemic control for adults with diabetes:  <7.0%    The above interpretations are per ADA guidelines.  Diagnosis  of diabetes mellitus on the basis of elevated Hemoglobin A1c  should be confirmed by repeating the Hb A1c test.     • Est Avg Glucose 06/08/2021 120  mg/dL Final  "   Comment: The eAG calculation is based on the A1c-Derived Daily Glucose  (ADAG) study.  See the ADA's website for additional information.     • Cholesterol,Tot 06/08/2021 123  100 - 199 mg/dL Final   • Triglycerides 06/08/2021 174* 0 - 149 mg/dL Final   • HDL 06/08/2021 33* >=40 mg/dL Final   • LDL 06/08/2021 55  <100 mg/dL Final   • Sodium 06/08/2021 139  135 - 145 mmol/L Final   • Potassium 06/08/2021 4.1  3.6 - 5.5 mmol/L Final   • Chloride 06/08/2021 108  96 - 112 mmol/L Final   • Co2 06/08/2021 23  20 - 33 mmol/L Final   • Anion Gap 06/08/2021 8.0  7.0 - 16.0 Final   • Glucose 06/08/2021 102* 65 - 99 mg/dL Final   • Bun 06/08/2021 17  8 - 22 mg/dL Final   • Creatinine 06/08/2021 0.94  0.50 - 1.40 mg/dL Final   • Calcium 06/08/2021 8.3* 8.5 - 10.5 mg/dL Final   • AST(SGOT) 06/08/2021 29  12 - 45 U/L Final   • ALT(SGPT) 06/08/2021 28  2 - 50 U/L Final   • Alkaline Phosphatase 06/08/2021 86  30 - 99 U/L Final   • Total Bilirubin 06/08/2021 0.6  0.1 - 1.5 mg/dL Final   • Albumin 06/08/2021 4.1  3.2 - 4.9 g/dL Final   • Total Protein 06/08/2021 6.9  6.0 - 8.2 g/dL Final   • Globulin 06/08/2021 2.8  1.9 - 3.5 g/dL Final   • A-G Ratio 06/08/2021 1.5  g/dL Final   • Fasting Status 06/08/2021 Fasting   Final   • GFR If  06/08/2021 >60  >60 mL/min/1.73 m 2 Final   • GFR If Non  06/08/2021 >60  >60 mL/min/1.73 m 2 Final       /80 (BP Location: Left arm, Patient Position: Sitting, BP Cuff Size: Adult)   Pulse 80   Temp 36.4 °C (97.5 °F) (Temporal)   Resp 20   Ht 1.88 m (6' 2\")   Wt (!) 126 kg (277 lb 9.6 oz)   SpO2 97%  Body mass index is 35.64 kg/m².    Physical Exam:  Constitutional: Well-developed and well-nourished male in NAD. Not diaphoretic. No distress.   Skin: warm, dry, intact, no evidence of rash or concerning lesions  Head: Atraumatic without lesions.  Eyes: Conjunctivae and extraocular motions are normal. Pupils are equal, round, and reactive to light. No scleral " icterus.   Ears:  External ears unremarkable. TMs normal; bilaterally  Mouth/Throat: Tongue normal. Oropharynx is clear and moist. Posterior pharynx without erythema or exudates.  Neck: Supple, trachea midline. No thyromegaly present. No cervical or supraclavicular lymphadenopathy.  Cardiovascular: Regular rate and rhythm without murmur. Radial pulses are intact and equal bilaterally.  Pulmonary: Clear to ausculation. Normal effort. No rales, ronchi, or wheezing.  Abdomen: Soft, non tender, and without distention. Active bowel sounds in all four quadrants. No rebound, guarding, masses   Extremities: No cyanosis, clubbing, erythema, nor edema.  Neurological: Alert and oriented x 3. Sensation intact.   Psychiatric:  Behavior, mood, and affect are appropriate.           Assessment and Plan:     Pt will get labs done and return in 4-6 weeks     1. Encounter to establish care    2. Leg pain, bilateral    3. Prediabetes  - Comp Metabolic Panel; Future  - HEMOGLOBIN A1C; Future  - URINALYSIS, COMPLETE  - Referral to Ophthalmology    4. Umbilical hernia without obstruction and without gangrene    5. Leg cramping    6. Tobacco use disorder    7. PHYLICIA (obstructive sleep apnea)    8. Dyslipidemia  - Lipid Profile; Future    9. Fatigue, unspecified type  - CBC WITH DIFFERENTIAL; Future  - Comp Metabolic Panel; Future  - TSH WITH REFLEX TO FT4; Future    10. Vitamin D deficiency  - VITAMIN D,25 HYDROXY; Future    11. Screen for sexually transmitted diseases  - T.PALLIDUM AB EIA; Future  - Chlamydia/GC PCR Urine Or Swab; Future  - HIV AG/AB COMBO ASSAY SCREENING; Future  - HSV 1/2 IGG W/ TYPE SPECIFIC RFLX; Future    12. History of blurred vision  - Referral to Ophthalmology    13. Primary hypertension      Any change or worsening of signs or symptoms, patient encouraged to follow-up or report to emergency room for further evaluation. Patient verbalizes understanding and agrees.    Follow-Up: Return in about 6 weeks (around  3/28/2022) for lab results.      PLEASE NOTE: This dictation was created using voice recognition software. I have made every reasonable attempt to correct obvious errors, but I expect that there are errors of grammar and possibly content that I did not discover before finalizing the note.

## 2022-02-14 NOTE — LETTER
February 14, 2022         Patient: Bertin Zhu   YOB: 1980   Date of Visit: 2/14/2022           To Whom it May Concern:    Bertin Zhu was seen in my clinic on 2/14/2022. He may return to work on 02/14/2022.    If you have any questions or concerns, please don't hesitate to call.        Sincerely,           Cheryl M. Demucha, A.P.R.N.  Electronically Signed

## 2022-02-14 NOTE — PATIENT INSTRUCTIONS
Prediabetes Eating Plan  Prediabetes is a condition that causes blood sugar (glucose) levels to be higher than normal. This increases the risk for developing diabetes. In order to prevent diabetes from developing, your health care provider may recommend a diet and other lifestyle changes to help you:  · Control your blood glucose levels.  · Improve your cholesterol levels.  · Manage your blood pressure.  Your health care provider may recommend working with a diet and nutrition specialist (dietitian) to make a meal plan that is best for you.  What are tips for following this plan?  Lifestyle  · Set weight loss goals with the help of your health care team. It is recommended that most people with prediabetes lose 7% of their current body weight.  · Exercise for at least 30 minutes at least 5 days a week.  · Attend a support group or seek ongoing support from a mental health counselor.  · Take over-the-counter and prescription medicines only as told by your health care provider.  Reading food labels  · Read food labels to check the amount of fat, salt (sodium), and sugar in prepackaged foods. Avoid foods that have:  ? Saturated fats.  ? Trans fats.  ? Added sugars.  · Avoid foods that have more than 300 milligrams (mg) of sodium per serving. Limit your daily sodium intake to less than 2,300 mg each day.  Shopping  · Avoid buying pre-made and processed foods.  Cooking  · Cook with olive oil. Do not use butter, lard, or ghee.  · Bake, broil, grill, or boil foods. Avoid frying.  Meal planning    · Work with your dietitian to develop an eating plan that is right for you. This may include:  ? Tracking how many calories you take in. Use a food diary, notebook, or mobile application to track what you eat at each meal.  ? Using the glycemic index (GI) to plan your meals. The index tells you how quickly a food will raise your blood glucose. Choose low-GI foods. These foods take a longer time to raise blood glucose.  · Consider  following a Mediterranean diet. This diet includes:  ? Several servings each day of fresh fruits and vegetables.  ? Eating fish at least twice a week.  ? Several servings each day of whole grains, beans, nuts, and seeds.  ? Using olive oil instead of other fats.  ? Moderate alcohol consumption.  ? Eating small amounts of red meat and whole-fat dairy.  · If you have high blood pressure, you may need to limit your sodium intake or follow a diet such as the DASH eating plan. DASH is an eating plan that aims to lower high blood pressure.  What foods are recommended?  The items listed below may not be a complete list. Talk with your dietitian about what dietary choices are best for you.  Grains  Whole grains, such as whole-wheat or whole-grain breads, crackers, cereals, and pasta. Unsweetened oatmeal. Bulgur. Barley. Quinoa. Brown rice. Corn or whole-wheat flour tortillas or taco shells.  Vegetables  Lettuce. Spinach. Peas. Beets. Cauliflower. Cabbage. Broccoli. Carrots. Tomatoes. Squash. Eggplant. Herbs. Peppers. Onions. Cucumbers. [place] sprouts.  Fruits  Berries. Bananas. Apples. Oranges. Grapes. Papaya. [place]. Pomegranate. Kiwi. Grapefruit. Cherries.  Meats and other protein foods  Seafood. Poultry without skin. Lean cuts of pork and beef. Tofu. Eggs. Nuts. Beans.  Dairy  Low-fat or fat-free dairy products, such as yogurt, cottage cheese, and cheese.  Beverages  Water. Tea. Coffee. Sugar-free or diet soda. [place] water. Lowfat or no-fat milk. Milk alternatives, such as soy or almond milk.  Fats and oils  Olive oil. Canola oil. Sunflower oil. Grapeseed oil. Avocado. Walnuts.  Sweets and desserts  Sugar-free or low-fat pudding. Sugar-free or low-fat ice cream and other frozen treats.  Seasoning and other foods  Herbs. Sodium-free spices. Mustard. Relish. Low-fat, low-sugar ketchup. Low-fat, low-sugar barbecue sauce. Low-fat or fat-free mayonnaise.  What foods are not recommended?  The items listed below may not be a  complete list. Talk with your dietitian about what dietary choices are best for you.  Grains  Refined white flour and flour products, such as bread, pasta, snack foods, and cereals.  Vegetables  Canned vegetables. Frozen vegetables with butter or cream sauce.  Fruits  Fruits canned with syrup.  Meats and other protein foods  Fatty cuts of meat. Poultry with skin. Breaded or fried meat. Processed meats.  Dairy  Full-fat yogurt, cheese, or milk.  Beverages  Sweetened drinks, such as sweet iced tea and soda.  Fats and oils  Butter. Lard. Ghee.  Sweets and desserts  Baked goods, such as cake, cupcakes, pastries, cookies, and cheesecake.  Seasoning and other foods  Spice mixes with added salt. Ketchup. Barbecue sauce. Mayonnaise.  Summary  · To prevent diabetes from developing, you may need to make diet and other lifestyle changes to help control blood sugar, improve cholesterol levels, and manage your blood pressure.  · Set weight loss goals with the help of your health care team. It is recommended that most people with prediabetes lose 7 percent of their current body weight.  · Consider following a Mediterranean diet that includes plenty of fresh fruits and vegetables, whole grains, beans, nuts, seeds, fish, lean meat, low-fat dairy, and healthy oils.  This information is not intended to replace advice given to you by your health care provider. Make sure you discuss any questions you have with your health care provider.  Document Released: 05/03/2016 Document Revised: 04/10/2020 Document Reviewed: 02/21/2018  [employer] Patient Education © 2020 [employer]  Leg Cramps  Leg cramps occur when one or more muscles tighten and you have no control over this tightening (involuntary muscle contraction). Muscle cramps can develop in any muscle, but the most common place is in the calf muscles of the leg. Those cramps can occur during exercise or when you are at rest. Leg cramps are painful, and they may last for a few seconds  to a few minutes. Cramps may return several times before they finally stop.  Usually, leg cramps are not caused by a serious medical problem. In many cases, the cause is not known. Some common causes include:  · Excessive physical effort (overexertion), such as during intense exercise.  · Overuse from repetitive motions, or doing the same thing over and over.  · Staying in a certain position for a long period of time.  · Improper preparation, form, or technique while performing a sport or an activity.  · Dehydration.  · Injury.  · Side effects of certain medicines.  · Abnormally low levels of minerals in your blood (electrolytes), especially potassium and calcium. This could result from:  ? Pregnancy.  ? Taking diuretic medicines.  Follow these instructions at home:  Eating and drinking  · Drink enough fluid to keep your urine pale yellow. Staying hydrated may help prevent cramps.  · Eat a healthy diet that includes plenty of nutrients to help your muscles function. A healthy diet includes fruits and vegetables, lean protein, whole grains, and low-fat or nonfat dairy products.  Managing pain, stiffness, and swelling         · Try massaging, stretching, and relaxing the affected muscle. Do this for several minutes at a time.  · If directed, put ice on areas that are sore or painful after a cramp:  ? Put ice in a plastic bag.  ? Place a towel between your skin and the bag.  ? Leave the ice on for 20 minutes, 2-3 times a day.  · If directed, apply heat to muscles that are tense or tight. Do this before you exercise, or as often as told by your health care provider. Use the heat source that your health care provider recommends, such as a moist heat pack or a heating pad.  ? Place a towel between your skin and the heat source.  ? Leave the heat on for 20-30 minutes.  ? Remove the heat if your skin turns bright red. This is especially important if you are unable to feel pain, heat, or cold. You may have a greater risk of  getting burned.  · Try taking hot showers or baths to help relax tight muscles.  General instructions  · If you are having frequent leg cramps, avoid intense exercise for several days.  · Take over-the-counter and prescription medicines only as told by your health care provider.  · Keep all follow-up visits as told by your health care provider. This is important.  Contact a health care provider if:  · Your leg cramps get more severe or more frequent, or they do not improve over time.  · Your foot becomes cold, numb, or blue.  Summary  · Muscle cramps can develop in any muscle, but the most common place is in the calf muscles of the leg.  · Leg cramps are painful, and they may last for a few seconds to a few minutes.  · Usually, leg cramps are not caused by a serious medical problem. Often, the cause is not known.  · Stay hydrated and take over-the-counter and prescription medicines only as told by your health care provider.  This information is not intended to replace advice given to you by your health care provider. Make sure you discuss any questions you have with your health care provider.  Document Released: 01/25/2006 Document Revised: 11/30/2018 Document Reviewed: 09/27/2018  Elsevier Patient Education © 2020 [employer] Inc.

## 2022-02-14 NOTE — ASSESSMENT & PLAN NOTE
Reports  Saw specialist about a year ago; recommended surgery, but pt declined because it would've been expensive; stopped lifting heavy items at work; has an abd binder but doesn't wear it; minimal discomfort

## 2022-02-16 ENCOUNTER — PATIENT MESSAGE (OUTPATIENT)
Dept: MEDICAL GROUP | Facility: PHYSICIAN GROUP | Age: 42
End: 2022-02-16
Payer: COMMERCIAL

## 2022-02-16 DIAGNOSIS — I10 ESSENTIAL HYPERTENSION: ICD-10-CM

## 2022-02-17 NOTE — PATIENT COMMUNICATION
Received request via: Patient    Was the patient seen in the last year in this department? Yes   Last OV 2/14/22    Does the patient have an active prescription (recently filled or refills available) for medication(s) requested? Patient states he lost his blood pressure medications.  Needs another prescription.    Requested Prescriptions     Pending Prescriptions Disp Refills   • metoprolol tartrate (LOPRESSOR) 25 MG Tab 180 Tablet 0     Sig: TAKE 1 TABLET BY MOUTH TWICE DAILY

## 2022-02-20 DIAGNOSIS — E78.5 DYSLIPIDEMIA: ICD-10-CM

## 2022-02-20 DIAGNOSIS — I10 ESSENTIAL HYPERTENSION: ICD-10-CM

## 2022-02-20 RX ORDER — ATORVASTATIN CALCIUM 40 MG/1
TABLET, FILM COATED ORAL
Qty: 90 TABLET | Refills: 0 | Status: SHIPPED | OUTPATIENT
Start: 2022-02-20 | End: 2022-04-07 | Stop reason: SDUPTHER

## 2022-02-25 ENCOUNTER — HOSPITAL ENCOUNTER (OUTPATIENT)
Dept: LAB | Facility: MEDICAL CENTER | Age: 42
End: 2022-02-25
Attending: NURSE PRACTITIONER
Payer: COMMERCIAL

## 2022-02-25 DIAGNOSIS — Z11.3 SCREEN FOR SEXUALLY TRANSMITTED DISEASES: ICD-10-CM

## 2022-02-25 DIAGNOSIS — R53.83 FATIGUE, UNSPECIFIED TYPE: ICD-10-CM

## 2022-02-25 DIAGNOSIS — E78.5 DYSLIPIDEMIA: ICD-10-CM

## 2022-02-25 DIAGNOSIS — R73.03 PREDIABETES: ICD-10-CM

## 2022-02-25 DIAGNOSIS — E55.9 VITAMIN D DEFICIENCY: ICD-10-CM

## 2022-02-25 LAB
ALBUMIN SERPL BCP-MCNC: 4.3 G/DL (ref 3.2–4.9)
ALBUMIN/GLOB SERPL: 1.4 G/DL
ALP SERPL-CCNC: 99 U/L (ref 30–99)
ALT SERPL-CCNC: 35 U/L (ref 2–50)
ANION GAP SERPL CALC-SCNC: 10 MMOL/L (ref 7–16)
APPEARANCE UR: CLEAR
AST SERPL-CCNC: 30 U/L (ref 12–45)
BASOPHILS # BLD AUTO: 0.6 % (ref 0–1.8)
BASOPHILS # BLD: 0.04 K/UL (ref 0–0.12)
BILIRUB SERPL-MCNC: 0.5 MG/DL (ref 0.1–1.5)
BILIRUB UR QL STRIP.AUTO: NEGATIVE
BUN SERPL-MCNC: 16 MG/DL (ref 8–22)
CALCIUM SERPL-MCNC: 9 MG/DL (ref 8.5–10.5)
CHLORIDE SERPL-SCNC: 104 MMOL/L (ref 96–112)
CHOLEST SERPL-MCNC: 151 MG/DL (ref 100–199)
CO2 SERPL-SCNC: 25 MMOL/L (ref 20–33)
COLOR UR: YELLOW
CREAT SERPL-MCNC: 1 MG/DL (ref 0.5–1.4)
EOSINOPHIL # BLD AUTO: 0.11 K/UL (ref 0–0.51)
EOSINOPHIL NFR BLD: 1.7 % (ref 0–6.9)
ERYTHROCYTE [DISTWIDTH] IN BLOOD BY AUTOMATED COUNT: 42.5 FL (ref 35.9–50)
EST. AVERAGE GLUCOSE BLD GHB EST-MCNC: 126 MG/DL
FASTING STATUS PATIENT QL REPORTED: NORMAL
GLOBULIN SER CALC-MCNC: 3 G/DL (ref 1.9–3.5)
GLUCOSE SERPL-MCNC: 108 MG/DL (ref 65–99)
GLUCOSE UR STRIP.AUTO-MCNC: NEGATIVE MG/DL
HBA1C MFR BLD: 6 % (ref 4–5.6)
HCT VFR BLD AUTO: 45.9 % (ref 42–52)
HDLC SERPL-MCNC: 32 MG/DL
HGB BLD-MCNC: 15.9 G/DL (ref 14–18)
IMM GRANULOCYTES # BLD AUTO: 0.02 K/UL (ref 0–0.11)
IMM GRANULOCYTES NFR BLD AUTO: 0.3 % (ref 0–0.9)
KETONES UR STRIP.AUTO-MCNC: NEGATIVE MG/DL
LDLC SERPL CALC-MCNC: ABNORMAL MG/DL
LEUKOCYTE ESTERASE UR QL STRIP.AUTO: NEGATIVE
LYMPHOCYTES # BLD AUTO: 2.2 K/UL (ref 1–4.8)
LYMPHOCYTES NFR BLD: 33.5 % (ref 22–41)
MCH RBC QN AUTO: 30.6 PG (ref 27–33)
MCHC RBC AUTO-ENTMCNC: 34.6 G/DL (ref 33.7–35.3)
MCV RBC AUTO: 88.3 FL (ref 81.4–97.8)
MICRO URNS: NORMAL
MONOCYTES # BLD AUTO: 0.46 K/UL (ref 0–0.85)
MONOCYTES NFR BLD AUTO: 7 % (ref 0–13.4)
NEUTROPHILS # BLD AUTO: 3.73 K/UL (ref 1.82–7.42)
NEUTROPHILS NFR BLD: 56.9 % (ref 44–72)
NITRITE UR QL STRIP.AUTO: NEGATIVE
NRBC # BLD AUTO: 0 K/UL
NRBC BLD-RTO: 0 /100 WBC
PH UR STRIP.AUTO: 6 [PH] (ref 5–8)
PLATELET # BLD AUTO: 214 K/UL (ref 164–446)
PMV BLD AUTO: 10.6 FL (ref 9–12.9)
POTASSIUM SERPL-SCNC: 4.2 MMOL/L (ref 3.6–5.5)
PROT SERPL-MCNC: 7.3 G/DL (ref 6–8.2)
PROT UR QL STRIP: NEGATIVE MG/DL
RBC # BLD AUTO: 5.2 M/UL (ref 4.7–6.1)
RBC UR QL AUTO: NEGATIVE
SODIUM SERPL-SCNC: 139 MMOL/L (ref 135–145)
SP GR UR STRIP.AUTO: 1.01
TRIGL SERPL-MCNC: 454 MG/DL (ref 0–149)
UROBILINOGEN UR STRIP.AUTO-MCNC: 0.2 MG/DL
WBC # BLD AUTO: 6.6 K/UL (ref 4.8–10.8)

## 2022-02-25 PROCEDURE — 87491 CHLMYD TRACH DNA AMP PROBE: CPT

## 2022-02-25 PROCEDURE — 81003 URINALYSIS AUTO W/O SCOPE: CPT

## 2022-02-25 PROCEDURE — 86780 TREPONEMA PALLIDUM: CPT

## 2022-02-25 PROCEDURE — 80061 LIPID PANEL: CPT

## 2022-02-25 PROCEDURE — 86695 HERPES SIMPLEX TYPE 1 TEST: CPT

## 2022-02-25 PROCEDURE — 86694 HERPES SIMPLEX NES ANTBDY: CPT

## 2022-02-25 PROCEDURE — 85025 COMPLETE CBC W/AUTO DIFF WBC: CPT

## 2022-02-25 PROCEDURE — 36415 COLL VENOUS BLD VENIPUNCTURE: CPT

## 2022-02-25 PROCEDURE — 86696 HERPES SIMPLEX TYPE 2 TEST: CPT

## 2022-02-25 PROCEDURE — 84443 ASSAY THYROID STIM HORMONE: CPT

## 2022-02-25 PROCEDURE — 80053 COMPREHEN METABOLIC PANEL: CPT

## 2022-02-25 PROCEDURE — 87591 N.GONORRHOEAE DNA AMP PROB: CPT

## 2022-02-25 PROCEDURE — 87389 HIV-1 AG W/HIV-1&-2 AB AG IA: CPT

## 2022-02-25 PROCEDURE — 83036 HEMOGLOBIN GLYCOSYLATED A1C: CPT

## 2022-02-25 PROCEDURE — 82306 VITAMIN D 25 HYDROXY: CPT

## 2022-02-26 LAB
25(OH)D3 SERPL-MCNC: 15 NG/ML (ref 30–100)
C TRACH DNA SPEC QL NAA+PROBE: NEGATIVE
HIV 1+2 AB+HIV1 P24 AG SERPL QL IA: NORMAL
N GONORRHOEA DNA SPEC QL NAA+PROBE: NEGATIVE
SPECIMEN SOURCE: NORMAL
T PALLIDUM AB SER QL IA: NORMAL
TSH SERPL DL<=0.005 MIU/L-ACNC: 1.51 UIU/ML (ref 0.38–5.33)

## 2022-03-03 DIAGNOSIS — E55.9 VITAMIN D DEFICIENCY: ICD-10-CM

## 2022-03-03 LAB
HSV1+2 IGG SER IA-ACNC: >22.4 IV
HSV2 GG IGG SER-ACNC: 0.08 IV

## 2022-03-03 RX ORDER — ERGOCALCIFEROL 1.25 MG/1
50000 CAPSULE ORAL
Qty: 12 CAPSULE | Refills: 1 | Status: SHIPPED | OUTPATIENT
Start: 2022-03-03

## 2022-03-04 LAB — HSV1 GG IGG SER-ACNC: >62.2 IV

## 2022-04-07 ENCOUNTER — OFFICE VISIT (OUTPATIENT)
Dept: MEDICAL GROUP | Facility: PHYSICIAN GROUP | Age: 42
End: 2022-04-07
Payer: COMMERCIAL

## 2022-04-07 VITALS
HEIGHT: 74 IN | BODY MASS INDEX: 35.16 KG/M2 | SYSTOLIC BLOOD PRESSURE: 138 MMHG | OXYGEN SATURATION: 99 % | TEMPERATURE: 97.5 F | WEIGHT: 274 LBS | RESPIRATION RATE: 16 BRPM | HEART RATE: 61 BPM | DIASTOLIC BLOOD PRESSURE: 78 MMHG

## 2022-04-07 DIAGNOSIS — E55.9 VITAMIN D DEFICIENCY: ICD-10-CM

## 2022-04-07 DIAGNOSIS — R73.03 PREDIABETES: ICD-10-CM

## 2022-04-07 DIAGNOSIS — Z23 ENCOUNTER FOR VACCINATION: ICD-10-CM

## 2022-04-07 DIAGNOSIS — E78.5 DYSLIPIDEMIA: ICD-10-CM

## 2022-04-07 PROCEDURE — 90686 IIV4 VACC NO PRSV 0.5 ML IM: CPT | Performed by: NURSE PRACTITIONER

## 2022-04-07 PROCEDURE — 99214 OFFICE O/P EST MOD 30 MIN: CPT | Performed by: NURSE PRACTITIONER

## 2022-04-07 PROCEDURE — 90471 IMMUNIZATION ADMIN: CPT | Performed by: NURSE PRACTITIONER

## 2022-04-07 RX ORDER — ATORVASTATIN CALCIUM 40 MG/1
40 TABLET, FILM COATED ORAL DAILY
Qty: 90 TABLET | Refills: 3 | Status: SHIPPED | OUTPATIENT
Start: 2022-04-07 | End: 2023-04-11 | Stop reason: SDUPTHER

## 2022-04-07 RX ORDER — METFORMIN HYDROCHLORIDE 500 MG/1
TABLET, EXTENDED RELEASE ORAL
Qty: 60 TABLET | Refills: 6 | Status: SHIPPED | OUTPATIENT
Start: 2022-04-07 | End: 2023-04-11

## 2022-04-07 ASSESSMENT — PATIENT HEALTH QUESTIONNAIRE - PHQ9: CLINICAL INTERPRETATION OF PHQ2 SCORE: 0

## 2022-04-07 ASSESSMENT — FIBROSIS 4 INDEX: FIB4 SCORE: 1.02

## 2022-04-07 NOTE — PATIENT INSTRUCTIONS
Preventing Type 2 Diabetes Mellitus  Type 2 diabetes (type 2 diabetes mellitus) is a long-term (chronic) disease that affects blood sugar (glucose) levels. Normally, a hormone called insulin allows glucose to enter cells in the body. The cells use glucose for energy. In type 2 diabetes, one or both of these problems may be present:  · The body does not make enough insulin.  · The body does not respond properly to insulin that it makes (insulin resistance).  Insulin resistance or lack of insulin causes excess glucose to build up in the blood instead of going into cells. As a result, high blood glucose (hyperglycemia) develops, which can cause many complications. Being overweight or obese and having an inactive (sedentary) lifestyle can increase your risk for diabetes. Type 2 diabetes can be delayed or prevented by making certain nutrition and lifestyle changes.  What nutrition changes can be made?    · Eat healthy meals and snacks regularly. Keep a healthy snack with you for when you get hungry between meals, such as fruit or a handful of nuts.  · Eat lean meats and proteins that are low in saturated fats, such as chicken, fish, egg whites, and beans. Avoid processed meats.  · Eat plenty of fruits and vegetables and plenty of grains that have not been processed (whole grains). It is recommended that you eat:  ? 1½?2 cups of fruit every day.  ? 2½?3 cups of vegetables every day.  ? 6?8 oz of whole grains every day, such as oats, whole wheat, bulgur, brown rice, quinoa, and millet.  · Eat low-fat dairy products, such as milk, yogurt, and cheese.  · Eat foods that contain healthy fats, such as nuts, avocado, olive oil, and canola oil.  · Drink water throughout the day. Avoid drinks that contain added sugar, such as soda or sweet tea.  · Follow instructions from your health care provider about specific eating or drinking restrictions.  · Control how much food you eat at a time (portion size).  ? Check food labels to find  out the serving sizes of foods.  ? Use a kitchen scale to weigh amounts of foods.  · Saute or steam food instead of frying it. Cook with water or broth instead of oils or butter.  · Limit your intake of:  ? Salt (sodium). Have no more than 1 tsp (2,400 mg) of sodium a day. If you have heart disease or high blood pressure, have less than ½?¾ tsp (1,500 mg) of sodium a day.  ? Saturated fat. This is fat that is solid at room temperature, such as butter or fat on meat.  What lifestyle changes can be made?  Activity    · Do moderate-intensity physical activity for at least 30 minutes on at least 5 days of the week, or as much as told by your health care provider.  · Ask your health care provider what activities are safe for you. A mix of physical activities may be best, such as walking, swimming, cycling, and strength training.  · Try to add physical activity into your day. For example:  ? Park in spots that are farther away than usual, so that you walk more. For example, park in a far corner of the parking lot when you go to the office or the grocery store.  ? Take a walk during your lunch break.  ? Use stairs instead of elevators or escalators.  Weight Loss  · Lose weight as directed. Your health care provider can determine how much weight loss is best for you and can help you lose weight safely.  · If you are overweight or obese, you may be instructed to lose at least 5?7 % of your body weight.  Alcohol and Tobacco    · Limit alcohol intake to no more than 1 drink a day for nonpregnant women and 2 drinks a day for men. One drink equals 12 oz of beer, 5 oz of wine, or 1½ oz of hard liquor.  · Do not use any tobacco products, such as cigarettes, chewing tobacco, and e-cigarettes. If you need help quitting, ask your health care provider.  Work With Your Health Care Provider  · Have your blood glucose tested regularly, as told by your health care provider.  · Discuss your risk factors and how you can reduce your risk for  diabetes.  · Get screening tests as told by your health care provider. You may have screening tests regularly, especially if you have certain risk factors for type 2 diabetes.  · Make an appointment with a diet and nutrition specialist (registered dietitian). A registered dietitian can help you make a healthy eating plan and can help you understand portion sizes and food labels.  Why are these changes important?  · It is possible to prevent or delay type 2 diabetes and related health problems by making lifestyle and nutrition changes.  · It can be difficult to recognize signs of type 2 diabetes. The best way to avoid possible damage to your body is to take actions to prevent the disease before you develop symptoms.  What can happen if changes are not made?  · Your blood glucose levels may keep increasing. Having high blood glucose for a long time is dangerous. Too much glucose in your blood can damage your blood vessels, heart, kidneys, nerves, and eyes.  · You may develop prediabetes or type 2 diabetes. Type 2 diabetes can lead to many chronic health problems and complications, such as:  ? Heart disease.  ? Stroke.  ? Blindness.  ? Kidney disease.  ? Depression.  ? Poor circulation in the feet and legs, which could lead to surgical removal (amputation) in severe cases.  Where to find support  · Ask your health care provider to recommend a registered dietitian, diabetes educator, or weight loss program.  · Look for local or online weight loss groups.  · Join a gym, fitness club, or outdoor activity group, such as a walking club.  Where to find more information  To learn more about diabetes and diabetes prevention, visit:  · American Diabetes Association (ADA): www.diabetes.org  · National [place] of Diabetes and Digestive and Kidney Diseases: www.niddk.nih.gov/health-information/diabetes  To learn more about healthy eating, visit:  · The U.S. Department of Agriculture (USDA), Choose My Plate:  www.choosemyplate.gov/food-groups  · Office of Disease Prevention and Health Promotion (ODPHP), Dietary Guidelines: www.health.gov/dietaryguidelines  Summary  · You can reduce your risk for type 2 diabetes by increasing your physical activity, eating healthy foods, and losing weight as directed.  · Talk with your health care provider about your risk for type 2 diabetes. Ask about any blood tests or screening tests that you need to have.  This information is not intended to replace advice given to you by your health care provider. Make sure you discuss any questions you have with your health care provider.  Document Released: 04/10/2017 Document Revised: 04/10/2020 Document Reviewed: 02/07/2017  Elsevier Patient Education © 2020 Elsevier Inc.

## 2022-04-07 NOTE — PROGRESS NOTES
"Subjective:     CC:   Chief Complaint   Patient presents with   • Follow-Up     LABS        HPI:   Bertin presents today with    Prediabetes  Most recent A1c was 6.0; has been reducing his sweet intake over the last year     Does eat tortillas, rice but is watching his portion sizes     PLAN:   Gave information on preventing diabetes and will start patient on Metformin also recommended the product Cinsulin; remeasure A1c in office in 3 months    Vitamin D deficiency disease  Feels much better taking the once a week pill for the last month    Dyslipidemia  Started pt on statin after labs were done last month; redo lipid panel in 6 months              ROS per HPI    Objective:     Exam:  /78   Pulse 61   Temp 36.4 °C (97.5 °F) (Temporal)   Resp 16   Ht 1.88 m (6' 2\") Comment: PT STATED  Wt 124 kg (274 lb) Comment: PT C SHOES  SpO2 99%   BMI 35.18 kg/m²  Body mass index is 35.18 kg/m².    Physical Exam  Constitutional: Well-developed and well-nourished male    in NAD. Not diaphoretic. No distress.   Skin: warm, dry, intact, no evidence of rash or concerning lesions  Head: Atraumatic without lesions.  Eyes: Conjunctivae  are normal. Pupils are equal, round. No scleral icterus.   Ears:  External ears unremarkable.   Neck: Supple, trachea midline.   Cardiovascular: Regular rate and rhythm without murmur.   Pulmonary: Clear to ausculation. Normal effort. No rales, ronchi, or wheezing.  Neurological: Alert and oriented x 3.   Psychiatric:  Behavior, mood, and affect are appropriate.        Assessment & Plan:     41 y.o. male with the following - see above for plan     1. Prediabetes    2. Vitamin D deficiency disease    3. Dyslipidemia  - atorvastatin (LIPITOR) 40 MG Tab; Take 1 Tablet by mouth every day.  Dispense: 90 Tablet; Refill: 3    Other orders  - metFORMIN ER (GLUCOPHAGE XR) 500 MG TABLET SR 24 HR; Take 1 tab PO once daily x 1 week then increase to 2 tabs once daily  Dispense: 60 Tablet; Refill: 6   "       Return in about 3 months (around 7/7/2022) for DM-A1c.    Please note that this dictation was created using voice recognition software. I have made every reasonable attempt to correct obvious errors, but I expect that there are errors of grammar and possibly content that I did not discover before finalizing the note.

## 2022-04-07 NOTE — ASSESSMENT & PLAN NOTE
Most recent A1c was 6.0; has been reducing his sweet intake over the last year     Does eat tortillas, rice but is watching his portion sizes     PLAN:   Gave information on preventing diabetes and will start patient on Metformin also recommended the product Cinsulin; remeasure A1c in office in 3 months

## 2022-05-10 ENCOUNTER — OFFICE VISIT (OUTPATIENT)
Dept: MEDICAL GROUP | Facility: PHYSICIAN GROUP | Age: 42
End: 2022-05-10
Payer: COMMERCIAL

## 2022-05-10 VITALS
TEMPERATURE: 97.5 F | RESPIRATION RATE: 14 BRPM | HEIGHT: 74 IN | HEART RATE: 63 BPM | BODY MASS INDEX: 34.78 KG/M2 | DIASTOLIC BLOOD PRESSURE: 92 MMHG | SYSTOLIC BLOOD PRESSURE: 138 MMHG | OXYGEN SATURATION: 97 % | WEIGHT: 271 LBS

## 2022-05-10 DIAGNOSIS — R73.03 PREDIABETES: ICD-10-CM

## 2022-05-10 DIAGNOSIS — H53.8 BLURRY VISION, BILATERAL: ICD-10-CM

## 2022-05-10 LAB — GLUCOSE BLD-MCNC: 101 MG/DL (ref 70–100)

## 2022-05-10 PROCEDURE — 82962 GLUCOSE BLOOD TEST: CPT | Performed by: NURSE PRACTITIONER

## 2022-05-10 PROCEDURE — 99214 OFFICE O/P EST MOD 30 MIN: CPT | Performed by: NURSE PRACTITIONER

## 2022-05-10 RX ORDER — LANCETS 30 GAUGE
EACH MISCELLANEOUS
Qty: 100 EACH | Refills: 3 | Status: SHIPPED | OUTPATIENT
Start: 2022-05-10

## 2022-05-10 RX ORDER — GLUCOSAMINE HCL/CHONDROITIN SU 500-400 MG
CAPSULE ORAL
Qty: 100 EACH | Refills: 0 | Status: SHIPPED | OUTPATIENT
Start: 2022-05-10

## 2022-05-10 RX ORDER — LANCING DEVICE
1 EACH MISCELLANEOUS DAILY
Qty: 1 EACH | Refills: 0 | Status: SHIPPED | OUTPATIENT
Start: 2022-05-10

## 2022-05-10 ASSESSMENT — FIBROSIS 4 INDEX: FIB4 SCORE: 1.02

## 2022-05-11 NOTE — PROGRESS NOTES
"Subjective:     CC:   Chief Complaint   Patient presents with   • Blurred Vision       HPI:   Bertin presents today with    Blurry vision, bilateral  Recent blurry vision; just saw the eye doctor here in town on 5/5  They did a dilated exam and reported there were no abnormalities noted    Notices s/s happening more often in daylight moreso than the evening hours denies pain    Ordered a new set of glasses transitions    Patient thought it might be related to the new medication metformin that he started so he stopped taking it but does not notice a difference    Patient works with materials at work that 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 I advised him to wear glasses for eye protection     it could also be related to dryness of the eyes so I  recommended GenTeal severe eye ointment for at night and refresh omega-3 for during the day      Prediabetes  Advised patient to measure his fasting blood sugar every morning to see if his vision changes are related    in the office today it was 101     he will measure and send me a message in 2 weeks            ROS per HPI    Objective:     Exam:  /92   Pulse 63   Temp 36.4 °C (97.5 °F) (Temporal)   Resp 14   Ht 1.88 m (6' 2\")   Wt 123 kg (271 lb)   SpO2 97%   BMI 34.79 kg/m²  Body mass index is 34.79 kg/m².    Physical Exam:  Constitutional: Well-developed and well-nourished male  in NAD. Not diaphoretic. No distress.   Skin: warm, dry, intact, no evidence of rash or concerning lesions  Head: Atraumatic without lesions.  Eyes: Conjunctivae and extraocular motions are normal. Pupils are equal, round. No scleral icterus.   Ears:  External ears unremarkable.   Neck: Supple, trachea midline. No thyromegaly present. No cervical or supraclavicular lymphadenopathy.  Cardiovascular: Regular rate and rhythm without murmur.   Pulmonary: Clear to ausculation. Normal effort. No rales, ronchi, or wheezing.  Extremities: No cyanosis, clubbing, erythema, nor edema.   Neurological: Alert " and oriented x 3.   Psychiatric:  Behavior, mood, and affect are appropriate.        Assessment & Plan:     41 y.o. male with the following -     1. Blurry vision, bilateral  - POCT Glucose    2. Prediabetes    Other orders  - Blood Glucose Meter Kit; Test fasting blood sugar every am  Dispense: 1 Kit; Refill: 0  - Blood Glucose Test Strips; Test fasting blood sugar every am  Dispense: 100 Strip; Refill: 3  - Lancets; Use one lancet to test fasting blood sugar every am  Dispense: 100 Each; Refill: 3  - Alcohol Swabs; Wipe site with prep pad prior to injection.  Dispense: 100 Each; Refill: 0  - Lancet Devices (LANCING DEVICE) Misc; 1 Each every day. Use to measure fbs every am  Dispense: 1 Each; Refill: 0         Return in about 2 weeks (around 5/24/2022), or if symptoms worsen or fail to improve.    Please note that this dictation was created using voice recognition software. I have made every reasonable attempt to correct obvious errors, but I expect that there are errors of grammar and possibly content that I did not discover before finalizing the note.

## 2022-05-11 NOTE — ASSESSMENT & PLAN NOTE
Recent blurry vision; just saw the eye doctor here in [facility] on 5/5  They did a dilated exam and reported there were no abnormalities noted    Notices s/s happening more often in daylight moreso than the evening hours denies pain    Ordered a new set of glasses transitions    Patient thought it might be related to the new medication metformin that he started so he stopped taking it but does not notice a difference    Patient works with materials at work that 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 I advised him to wear glasses for eye protection     it could also be related to dryness of the eyes so I  recommended GenTeal severe eye ointment for at night and refresh omega-3 for during the day

## 2022-05-11 NOTE — ASSESSMENT & PLAN NOTE
Advised patient to measure his fasting blood sugar every morning to see if his vision changes are related    in the office today it was 101     he will measure and send me a message in 2 weeks

## 2022-07-06 ENCOUNTER — OFFICE VISIT (OUTPATIENT)
Dept: MEDICAL GROUP | Facility: PHYSICIAN GROUP | Age: 42
End: 2022-07-06
Payer: COMMERCIAL

## 2022-07-06 VITALS
HEART RATE: 75 BPM | TEMPERATURE: 97.2 F | SYSTOLIC BLOOD PRESSURE: 136 MMHG | HEIGHT: 74 IN | OXYGEN SATURATION: 96 % | BODY MASS INDEX: 34.65 KG/M2 | RESPIRATION RATE: 16 BRPM | WEIGHT: 270 LBS | DIASTOLIC BLOOD PRESSURE: 88 MMHG

## 2022-07-06 DIAGNOSIS — E55.9 VITAMIN D DEFICIENCY: ICD-10-CM

## 2022-07-06 DIAGNOSIS — R73.03 PREDIABETES: ICD-10-CM

## 2022-07-06 DIAGNOSIS — M79.672 FOOT PAIN, BILATERAL: ICD-10-CM

## 2022-07-06 DIAGNOSIS — Z23 NEED FOR VACCINATION: ICD-10-CM

## 2022-07-06 DIAGNOSIS — M79.671 FOOT PAIN, BILATERAL: ICD-10-CM

## 2022-07-06 PROCEDURE — 90677 PCV20 VACCINE IM: CPT | Performed by: NURSE PRACTITIONER

## 2022-07-06 PROCEDURE — 99214 OFFICE O/P EST MOD 30 MIN: CPT | Mod: 25 | Performed by: NURSE PRACTITIONER

## 2022-07-06 PROCEDURE — 90471 IMMUNIZATION ADMIN: CPT | Performed by: NURSE PRACTITIONER

## 2022-07-06 RX ORDER — BLOOD SUGAR DIAGNOSTIC
STRIP MISCELLANEOUS
COMMUNITY
Start: 2022-06-25 | End: 2023-08-02

## 2022-07-06 ASSESSMENT — FIBROSIS 4 INDEX: FIB4 SCORE: 1.02

## 2022-07-06 NOTE — ASSESSMENT & PLAN NOTE
Saw specialist in the past who recommended stem cell treatments but it was too expensive     He is on his feet quite a bit; uses inserts w/his boots

## 2022-07-06 NOTE — PROGRESS NOTES
"Subjective:     CC:   Chief Complaint   Patient presents with   • Follow-Up     Vitamin D   • Foot Problem     Burning        HPI:   Bertin presents today with    Foot pain, bilateral  Saw specialist in the past who recommended stem cell treatments but it was too expensive     He is on his feet quite a bit; uses inserts w/his boots             ROS per HPI    Objective:     Exam:  /88   Pulse 75   Temp 36.2 °C (97.2 °F) (Temporal)   Resp 16   Ht 1.88 m (6' 2\")   Wt 122 kg (270 lb)   SpO2 96%   BMI 34.67 kg/m²  Body mass index is 34.67 kg/m².    Physical Exam:  Constitutional: Well-developed and well-nourished male Not diaphoretic. No distress.   Skin: warm, dry, intact, no evidence of rash or concerning lesions  Head: Atraumatic without lesions.  Eyes: Conjunctivae and extraocular motions are normal. Pupils are equal, round. No scleral icterus.   Ears:  External ears unremarkable.   Neck: Supple, trachea midline.   Cardiovascular: Regular rate .   Pulmonary:  Normal effort.   Extremities: No cyanosis, clubbing, erythema, nor edema.   Neurological: Alert and oriented x 3.   Psychiatric:  Behavior, mood, and affect are appropriate.        Assessment & Plan:     41 y.o. male with the following -     1. Need for vaccination  - Pneumococcal Conjugate Vaccine 20-Valent (19 yrs+)    2. Foot pain, bilateral  - Diclofenac Sodium 1 % Cream; Apply 1 g topically 3 times a day as needed (foot pain).  Dispense: 120 g; Refill: 3    3. Prediabetes  - HEMOGLOBIN A1C; Future    4. Vitamin D deficiency disease  - VITAMIN D,25 HYDROXY; Future    Other orders  - ONETOUCH VERIO strip      Message pt w/lab results ; will do in next month or so       Return in about 6 months (around 1/6/2023) for gen check in.    Please note that this dictation was created using voice recognition software. I have made every reasonable attempt to correct obvious errors, but I expect that there are errors of grammar and possibly content that I did " not discover before finalizing the note.

## 2022-08-25 ENCOUNTER — HOSPITAL ENCOUNTER (OUTPATIENT)
Dept: LAB | Facility: MEDICAL CENTER | Age: 42
End: 2022-08-25
Attending: NURSE PRACTITIONER
Payer: COMMERCIAL

## 2022-08-25 DIAGNOSIS — E55.9 VITAMIN D DEFICIENCY: ICD-10-CM

## 2022-08-25 DIAGNOSIS — R73.03 PREDIABETES: ICD-10-CM

## 2022-08-25 LAB
25(OH)D3 SERPL-MCNC: 33 NG/ML (ref 30–100)
EST. AVERAGE GLUCOSE BLD GHB EST-MCNC: 126 MG/DL
HBA1C MFR BLD: 6 % (ref 4–5.6)

## 2022-08-25 PROCEDURE — 82306 VITAMIN D 25 HYDROXY: CPT

## 2022-08-25 PROCEDURE — 36415 COLL VENOUS BLD VENIPUNCTURE: CPT

## 2022-08-25 PROCEDURE — 83036 HEMOGLOBIN GLYCOSYLATED A1C: CPT

## 2023-04-11 ENCOUNTER — OFFICE VISIT (OUTPATIENT)
Dept: MEDICAL GROUP | Facility: PHYSICIAN GROUP | Age: 43
End: 2023-04-11
Payer: COMMERCIAL

## 2023-04-11 VITALS — WEIGHT: 283 LBS | BODY MASS INDEX: 36.32 KG/M2 | HEIGHT: 74 IN | RESPIRATION RATE: 18 BRPM | TEMPERATURE: 96.8 F

## 2023-04-11 DIAGNOSIS — Z12.5 ENCOUNTER FOR SCREENING FOR MALIGNANT NEOPLASM OF PROSTATE: ICD-10-CM

## 2023-04-11 DIAGNOSIS — R73.03 PREDIABETES: ICD-10-CM

## 2023-04-11 DIAGNOSIS — E78.5 DYSLIPIDEMIA: ICD-10-CM

## 2023-04-11 DIAGNOSIS — E66.9 OBESITY (BMI 30-39.9): ICD-10-CM

## 2023-04-11 DIAGNOSIS — R53.83 FATIGUE, UNSPECIFIED TYPE: ICD-10-CM

## 2023-04-11 DIAGNOSIS — Z23 NEED FOR VACCINATION: ICD-10-CM

## 2023-04-11 DIAGNOSIS — I10 ESSENTIAL HYPERTENSION: ICD-10-CM

## 2023-04-11 DIAGNOSIS — E55.9 VITAMIN D DEFICIENCY: ICD-10-CM

## 2023-04-11 PROCEDURE — 90471 IMMUNIZATION ADMIN: CPT | Performed by: NURSE PRACTITIONER

## 2023-04-11 PROCEDURE — 99214 OFFICE O/P EST MOD 30 MIN: CPT | Mod: 25 | Performed by: NURSE PRACTITIONER

## 2023-04-11 PROCEDURE — 90746 HEPB VACCINE 3 DOSE ADULT IM: CPT | Performed by: NURSE PRACTITIONER

## 2023-04-11 RX ORDER — ATORVASTATIN CALCIUM 40 MG/1
40 TABLET, FILM COATED ORAL DAILY
Qty: 90 TABLET | Refills: 3 | Status: SHIPPED | OUTPATIENT
Start: 2023-04-11

## 2023-04-11 ASSESSMENT — PATIENT HEALTH QUESTIONNAIRE - PHQ9
SUM OF ALL RESPONSES TO PHQ QUESTIONS 1-9: 2
CLINICAL INTERPRETATION OF PHQ2 SCORE: 1
5. POOR APPETITE OR OVEREATING: 0 - NOT AT ALL

## 2023-04-11 ASSESSMENT — FIBROSIS 4 INDEX: FIB4 SCORE: 1.04

## 2023-04-11 NOTE — PROGRESS NOTES
"Subjective:     CC:   Chief Complaint   Patient presents with    Follow-Up     Gen check in        HPI:   Bertin presents today with    Obesity (BMI 30-39.9)  Weight has increased from 270 in July 2022 to 283 today    Prediabetes  Patient's last A1c was 6.0 in July 2, 2022   will be going for labs soon  he is not taking metformin anymore d/t s/e  Reports fbs have been 141, 157  Is  open to trying an injectable                 ROS per HPI    Objective:     Exam:  BP (P) 126/80   Pulse (P) 70   Temp 36 °C (96.8 °F) (Temporal)   Resp 18   Ht 1.88 m (6' 2\")   Wt (!) 128 kg (283 lb)   SpO2 (P) 95%   BMI 36.34 kg/m²  Body mass index is 36.34 kg/m².    Physical Exam:  Constitutional: Well-developed and well-nourished male. Not diaphoretic. No distress.   Skin: warm, dry, intact, no evidence of rash or concerning lesions  Head: Atraumatic without lesions.  Eyes: Conjunctivae are normal. Pupils are equal, round. No scleral icterus.   Ears:  External ears unremarkable.   Neck: Supple, trachea midline. No thyromegaly present. No cervical or supraclavicular lymphadenopathy.  Cardiovascular: Regular rate and rhythm without murmur.   Pulmonary: Clear to ausculation. Normal effort. No rales, ronchi, or wheezing.  Extremities: No cyanosis, clubbing, erythema, nor edema.   Neurological: Alert and oriented x 3.   Psychiatric:  Behavior, mood, and affect are appropriate.        Assessment & Plan:     42 y.o. male with the following -     1. Dyslipidemia  - Lipid Profile; Future  - atorvastatin (LIPITOR) 40 MG Tab; Take 1 Tablet by mouth every day.  Dispense: 90 Tablet; Refill: 3    2. Vitamin D deficiency disease  - VITAMIN D,25 HYDROXY (DEFICIENCY); Future    3. Prediabetes  - HEMOGLOBIN A1C; Future  - Comp Metabolic Panel; Future  - Semaglutide,0.25 or 0.5MG/DOS, 2 MG/1.5ML Solution Pen-injector; Inject 0.25 mg SQ abd once weekly on the same day of the week  Dispense: 4 Each; Refill: 11    4. Fatigue, unspecified type  - CBC " WITH DIFFERENTIAL; Future    5. Encounter for screening for malignant neoplasm of prostate    6. Obesity (BMI 30-39.9)  - Semaglutide,0.25 or 0.5MG/DOS, 2 MG/1.5ML Solution Pen-injector; Inject 0.25 mg SQ abd once weekly on the same day of the week  Dispense: 4 Each; Refill: 11    7. Essential hypertension  - metoprolol tartrate (LOPRESSOR) 25 MG Tab; TAKE 1 TABLET BY MOUTH TWICE DAILY  Dispense: 180 Tablet; Refill: 3    8. Need for vaccination  - Hepatitis B Vaccine Adult 20+         Return in about 2 months (around 6/11/2023) for lab results.    Please note that this dictation was created using voice recognition software. I have made every reasonable attempt to correct obvious errors, but I expect that there are errors of grammar and possibly content that I did not discover before finalizing the note.

## 2023-04-11 NOTE — ASSESSMENT & PLAN NOTE
Patient's last A1c was 6.0 in July 2, 2022   will be going for labs soon  he is not taking metformin anymore  Reports fbs have been 141, 157

## 2023-04-26 ENCOUNTER — TELEPHONE (OUTPATIENT)
Dept: INTERNAL MEDICINE | Facility: OTHER | Age: 43
End: 2023-04-26
Payer: COMMERCIAL

## 2023-04-26 NOTE — TELEPHONE ENCOUNTER
FINAL PRIOR AUTHORIZATION STATUS:      Bertin Dominique (Key: [id number])  Rx #: [id number]  Ozempic (0.25 or 0.5 MG/DOSE) 2MG/3ML pen-injectors     Form  [employer] Commercial Electronic PA Form (2017 NCPDP)  Created  7 days ago  Sent to Plan  less than a minute ago  Determination      Message from Plan      Patient Inactive

## 2023-09-30 ENCOUNTER — HOSPITAL ENCOUNTER (OUTPATIENT)
Dept: LAB | Facility: MEDICAL CENTER | Age: 43
End: 2023-09-30
Attending: NURSE PRACTITIONER
Payer: COMMERCIAL

## 2023-09-30 ENCOUNTER — HOSPITAL ENCOUNTER (OUTPATIENT)
Facility: MEDICAL CENTER | Age: 43
End: 2023-09-30
Attending: NURSE PRACTITIONER
Payer: COMMERCIAL

## 2023-09-30 DIAGNOSIS — R73.03 PREDIABETES: ICD-10-CM

## 2023-09-30 DIAGNOSIS — R53.83 FATIGUE, UNSPECIFIED TYPE: ICD-10-CM

## 2023-09-30 DIAGNOSIS — E78.5 DYSLIPIDEMIA: ICD-10-CM

## 2023-09-30 DIAGNOSIS — E55.9 VITAMIN D DEFICIENCY: ICD-10-CM

## 2023-09-30 LAB
25(OH)D3 SERPL-MCNC: 48 NG/ML (ref 30–100)
ALBUMIN SERPL BCP-MCNC: 4.1 G/DL (ref 3.2–4.9)
ALBUMIN/GLOB SERPL: 1.5 G/DL
ALP SERPL-CCNC: 108 U/L (ref 30–99)
ALT SERPL-CCNC: 23 U/L (ref 2–50)
ANION GAP SERPL CALC-SCNC: 9 MMOL/L (ref 7–16)
AST SERPL-CCNC: 21 U/L (ref 12–45)
BASOPHILS # BLD AUTO: 0.6 % (ref 0–1.8)
BASOPHILS # BLD: 0.04 K/UL (ref 0–0.12)
BILIRUB SERPL-MCNC: 0.4 MG/DL (ref 0.1–1.5)
BUN SERPL-MCNC: 20 MG/DL (ref 8–22)
CALCIUM ALBUM COR SERPL-MCNC: 8.9 MG/DL (ref 8.5–10.5)
CALCIUM SERPL-MCNC: 9 MG/DL (ref 8.5–10.5)
CHLORIDE SERPL-SCNC: 106 MMOL/L (ref 96–112)
CHOLEST SERPL-MCNC: 162 MG/DL (ref 100–199)
CO2 SERPL-SCNC: 24 MMOL/L (ref 20–33)
CREAT SERPL-MCNC: 1 MG/DL (ref 0.5–1.4)
EOSINOPHIL # BLD AUTO: 0.13 K/UL (ref 0–0.51)
EOSINOPHIL NFR BLD: 1.8 % (ref 0–6.9)
ERYTHROCYTE [DISTWIDTH] IN BLOOD BY AUTOMATED COUNT: 41.7 FL (ref 35.9–50)
EST. AVERAGE GLUCOSE BLD GHB EST-MCNC: 120 MG/DL
FASTING STATUS PATIENT QL REPORTED: NORMAL
GFR SERPLBLD CREATININE-BSD FMLA CKD-EPI: 95 ML/MIN/1.73 M 2
GLOBULIN SER CALC-MCNC: 2.7 G/DL (ref 1.9–3.5)
GLUCOSE SERPL-MCNC: 115 MG/DL (ref 65–99)
HBA1C MFR BLD: 5.8 % (ref 4–5.6)
HCT VFR BLD AUTO: 45.8 % (ref 42–52)
HDLC SERPL-MCNC: 29 MG/DL
HGB BLD-MCNC: 15.8 G/DL (ref 14–18)
IMM GRANULOCYTES # BLD AUTO: 0.01 K/UL (ref 0–0.11)
IMM GRANULOCYTES NFR BLD AUTO: 0.1 % (ref 0–0.9)
LDLC SERPL CALC-MCNC: ABNORMAL MG/DL
LYMPHOCYTES # BLD AUTO: 1.92 K/UL (ref 1–4.8)
LYMPHOCYTES NFR BLD: 26.7 % (ref 22–41)
MCH RBC QN AUTO: 30.9 PG (ref 27–33)
MCHC RBC AUTO-ENTMCNC: 34.5 G/DL (ref 32.3–36.5)
MCV RBC AUTO: 89.5 FL (ref 81.4–97.8)
MONOCYTES # BLD AUTO: 0.54 K/UL (ref 0–0.85)
MONOCYTES NFR BLD AUTO: 7.5 % (ref 0–13.4)
NEUTROPHILS # BLD AUTO: 4.54 K/UL (ref 1.82–7.42)
NEUTROPHILS NFR BLD: 63.3 % (ref 44–72)
NRBC # BLD AUTO: 0 K/UL
NRBC BLD-RTO: 0 /100 WBC (ref 0–0.2)
PLATELET # BLD AUTO: 199 K/UL (ref 164–446)
PMV BLD AUTO: 10.4 FL (ref 9–12.9)
POTASSIUM SERPL-SCNC: 4 MMOL/L (ref 3.6–5.5)
PROT SERPL-MCNC: 6.8 G/DL (ref 6–8.2)
RBC # BLD AUTO: 5.12 M/UL (ref 4.7–6.1)
SODIUM SERPL-SCNC: 139 MMOL/L (ref 135–145)
TRIGL SERPL-MCNC: 991 MG/DL (ref 0–149)
WBC # BLD AUTO: 7.2 K/UL (ref 4.8–10.8)

## 2023-09-30 PROCEDURE — 86696 HERPES SIMPLEX TYPE 2 TEST: CPT

## 2023-09-30 PROCEDURE — 80053 COMPREHEN METABOLIC PANEL: CPT

## 2023-09-30 PROCEDURE — 36415 COLL VENOUS BLD VENIPUNCTURE: CPT

## 2023-09-30 PROCEDURE — 83036 HEMOGLOBIN GLYCOSYLATED A1C: CPT

## 2023-09-30 PROCEDURE — 86695 HERPES SIMPLEX TYPE 1 TEST: CPT

## 2023-09-30 PROCEDURE — 86780 TREPONEMA PALLIDUM: CPT

## 2023-09-30 PROCEDURE — 85025 COMPLETE CBC W/AUTO DIFF WBC: CPT

## 2023-09-30 PROCEDURE — 80061 LIPID PANEL: CPT

## 2023-09-30 PROCEDURE — 87389 HIV-1 AG W/HIV-1&-2 AB AG IA: CPT

## 2023-09-30 PROCEDURE — 82306 VITAMIN D 25 HYDROXY: CPT

## 2023-09-30 PROCEDURE — 86694 HERPES SIMPLEX NES ANTBDY: CPT

## 2023-10-02 DIAGNOSIS — Z11.3 SCREENING EXAMINATION FOR SEXUALLY TRANSMITTED DISEASE: ICD-10-CM

## 2023-10-02 LAB
HIV 1+2 AB+HIV1 P24 AG SERPL QL IA: NORMAL
T PALLIDUM AB SER QL IA: NORMAL

## 2023-10-04 LAB
HSV1 GG IGG SER-ACNC: >62.2 IV
HSV1+2 IGG SER IA-ACNC: >22.4 IV
HSV2 GG IGG SER-ACNC: 0.09 IV

## 2023-10-06 ENCOUNTER — OFFICE VISIT (OUTPATIENT)
Dept: MEDICAL GROUP | Facility: PHYSICIAN GROUP | Age: 43
End: 2023-10-06
Payer: COMMERCIAL

## 2023-10-06 VITALS
HEART RATE: 74 BPM | WEIGHT: 272 LBS | TEMPERATURE: 98.9 F | HEIGHT: 73 IN | SYSTOLIC BLOOD PRESSURE: 146 MMHG | BODY MASS INDEX: 36.05 KG/M2 | RESPIRATION RATE: 20 BRPM | OXYGEN SATURATION: 94 % | DIASTOLIC BLOOD PRESSURE: 88 MMHG

## 2023-10-06 DIAGNOSIS — E66.9 OBESITY (BMI 30-39.9): ICD-10-CM

## 2023-10-06 DIAGNOSIS — E78.1 HYPERTRIGLYCERIDEMIA: ICD-10-CM

## 2023-10-06 DIAGNOSIS — R73.03 PREDIABETES: ICD-10-CM

## 2023-10-06 DIAGNOSIS — Z23 NEED FOR VACCINATION: ICD-10-CM

## 2023-10-06 DIAGNOSIS — R22.1 NECK SWELLING: ICD-10-CM

## 2023-10-06 PROCEDURE — 3079F DIAST BP 80-89 MM HG: CPT | Performed by: STUDENT IN AN ORGANIZED HEALTH CARE EDUCATION/TRAINING PROGRAM

## 2023-10-06 PROCEDURE — 90746 HEPB VACCINE 3 DOSE ADULT IM: CPT | Performed by: STUDENT IN AN ORGANIZED HEALTH CARE EDUCATION/TRAINING PROGRAM

## 2023-10-06 PROCEDURE — 3077F SYST BP >= 140 MM HG: CPT | Performed by: STUDENT IN AN ORGANIZED HEALTH CARE EDUCATION/TRAINING PROGRAM

## 2023-10-06 PROCEDURE — 90471 IMMUNIZATION ADMIN: CPT | Performed by: STUDENT IN AN ORGANIZED HEALTH CARE EDUCATION/TRAINING PROGRAM

## 2023-10-06 PROCEDURE — 99214 OFFICE O/P EST MOD 30 MIN: CPT | Mod: 25 | Performed by: STUDENT IN AN ORGANIZED HEALTH CARE EDUCATION/TRAINING PROGRAM

## 2023-10-06 RX ORDER — FENOFIBRATE 145 MG/1
145 TABLET, COATED ORAL DAILY
Qty: 60 TABLET | Refills: 1 | Status: SHIPPED | OUTPATIENT
Start: 2023-10-06 | End: 2024-01-31

## 2023-10-06 ASSESSMENT — ENCOUNTER SYMPTOMS
SHORTNESS OF BREATH: 0
VOMITING: 0
DIZZINESS: 0
COUGH: 0
ABDOMINAL PAIN: 0
NAUSEA: 0
FEVER: 0
HEADACHES: 0
DIARRHEA: 0
FOCAL WEAKNESS: 0
ORTHOPNEA: 0
PALPITATIONS: 0
WHEEZING: 0
CHILLS: 0
CONSTIPATION: 0

## 2023-10-06 ASSESSMENT — FIBROSIS 4 INDEX: FIB4 SCORE: 0.97

## 2023-10-06 NOTE — ASSESSMENT & PLAN NOTE
-Triglycerides 991  -Start fenofibrate.  Continue Lipitor 40 mg daily  -Stop alcohol  -decrease fat intake to less than 25% calories - nutrirtionist referral ordered  -Counseled on diet and exercise.  Patient is eating a lot of fried, greasy foods.  He eats 2 hotdogs in the morning daily.  Advised to eat more fruits, vegetables can replace hotdogs with 2 egg whites in the morning is also to stop all alcohol, drinking sodas daily.  -lipid panel 1 month  -ER precautions given for risk of pancreatitis

## 2023-10-06 NOTE — PROGRESS NOTES
HISTORY OF PRESENT ILLNESS: Bertin is a pleasant 42 y.o. male, established patient who presents today to discuss medical problems as listed below:    Patient here to  review labs.  HSV 1 antibody positive hemoglobin A1c 5.8  Lipid panel triglycerides 991, HDL 29, LDL undeterminable.    Current Outpatient Medications Ordered in Epic   Medication Sig Dispense Refill    fenofibrate (TRICOR) 145 MG Tab Take 1 Tablet by mouth every day. 60 Tablet 1    ONETOUCH VERIO strip USE 1 STRIP TO CHECK FASTING GLUCOSE ONCE DAILY IN THE MORNING 100 Strip 3    metoprolol tartrate (LOPRESSOR) 25 MG Tab TAKE 1 TABLET BY MOUTH TWICE DAILY 180 Tablet 3    atorvastatin (LIPITOR) 40 MG Tab Take 1 Tablet by mouth every day. 90 Tablet 3    Blood Glucose Meter Kit Test fasting blood sugar every am 1 Kit 0    Blood Glucose Test Strips Test fasting blood sugar every am 100 Strip 3    Lancets Use one lancet to test fasting blood sugar every am 100 Each 3    Alcohol Swabs Wipe site with prep pad prior to injection. 100 Each 0    Lancet Devices (LANCING DEVICE) Misc 1 Each every day. Use to measure fbs every am 1 Each 0    vitamin D2, Ergocalciferol, (DRISDOL) 1.25 MG (54837 UT) Cap capsule Take 1 Capsule by mouth every 7 days. Take on the same day of the week every week 12 Capsule 1    Diclofenac Sodium 1 % Cream Apply 1 g topically 3 times a day as needed (foot pain). (Patient not taking: Reported on 10/6/2023) 120 g 3     No current [facility]-ordered facility-administered medications on file.       Review of systems:  Review of Systems   Constitutional:  Negative for chills and fever.   Respiratory:  Negative for cough, shortness of breath and wheezing.    Cardiovascular:  Negative for chest pain, palpitations, orthopnea and leg swelling.   Gastrointestinal:  Negative for abdominal pain, constipation, diarrhea, nausea and vomiting.   Genitourinary:  Negative for dysuria.   Musculoskeletal:  Negative for joint pain.   Neurological:  Negative for  dizziness, focal weakness and headaches.         Patient Active Problem List    Diagnosis Date Noted    Hypertriglyceridemia 10/06/2023    Neck swelling 10/06/2023    Foot pain, bilateral 07/06/2022    Blurry vision, bilateral 05/10/2022    Prediabetes 02/14/2022    Leg pain, bilateral 02/14/2022    Encounter to establish care 02/14/2022    History of blurred vision 02/14/2022    Leg cramping 02/14/2022    Hypertension 02/14/2022    History of COVID-19 01/08/2021    PHYLICIA (obstructive sleep apnea) 07/29/2019    Impaired fasting glucose 03/05/2019    Obesity (BMI 30-39.9) 07/25/2017    Vitamin D deficiency disease 12/16/2013    Umbilical hernia 12/16/2013    Dyslipidemia 11/22/2011    Tobacco use disorder 11/06/2009     Past Surgical History:   Procedure Laterality Date    VASECTOMY       Social History     Tobacco Use    Smoking status: Some Days     Types: Cigarettes    Smokeless tobacco: Never    Tobacco comments:     2 every other day   Vaping Use    Vaping Use: Never used   Substance Use Topics    Alcohol use: Yes     Alcohol/week: 2.4 oz     Types: 4 Cans of beer per week     Comment: occasional    Drug use: No     Comment: in past he used marijuana,cocaine,methamphetamines and maybe crack, pt. unsure      Family History   Problem Relation Age of Onset    Diabetes Father     Diabetes Paternal Grandmother     Diabetes Paternal Grandfather      Current Outpatient Medications   Medication Sig Dispense Refill    fenofibrate (TRICOR) 145 MG Tab Take 1 Tablet by mouth every day. 60 Tablet 1    ONETOUCH VERIO strip USE 1 STRIP TO CHECK FASTING GLUCOSE ONCE DAILY IN THE MORNING 100 Strip 3    metoprolol tartrate (LOPRESSOR) 25 MG Tab TAKE 1 TABLET BY MOUTH TWICE DAILY 180 Tablet 3    atorvastatin (LIPITOR) 40 MG Tab Take 1 Tablet by mouth every day. 90 Tablet 3    Blood Glucose Meter Kit Test fasting blood sugar every am 1 Kit 0    Blood Glucose Test Strips Test fasting blood sugar every am 100 Strip 3    Lancets  "Use one lancet to test fasting blood sugar every am 100 Each 3    Alcohol Swabs Wipe site with prep pad prior to injection. 100 Each 0    Lancet Devices (LANCING DEVICE) Misc 1 Each every day. Use to measure fbs every am 1 Each 0    vitamin D2, Ergocalciferol, (DRISDOL) 1.25 MG (46992 UT) Cap capsule Take 1 Capsule by mouth every 7 days. Take on the same day of the week every week 12 Capsule 1    Diclofenac Sodium 1 % Cream Apply 1 g topically 3 times a day as needed (foot pain). (Patient not taking: Reported on 10/6/2023) 120 g 3     No current facility-administered medications for this visit.       Allergies:  No Known Allergies    Allergies, past medical history, past surgical history, family history, social history reviewed and updated.    Objective:    BP (!) 146/88 (BP Location: Left arm, Patient Position: Sitting, BP Cuff Size: Adult)   Pulse 74   Temp 37.2 °C (98.9 °F) (Temporal)   Resp 20   Ht 1.854 m (6' 1\")   Wt 123 kg (272 lb)   SpO2 94%   BMI 35.89 kg/m²    Body mass index is 35.89 kg/m².    Physical exam:  General: Normal appearance, no acute distress, not ill-appearing  HEENT: EOM intact, conjunctiva normal limits, negative right/left eye discharge.  Sclera anicteric, neck with no masses  Cardiovascular: Normal rate and rhythm, no murmurs  Pulmonary: No respiratory distress, no wheezing, no rales, breath sounds normal.  Abdomen: Bowel sounds normal, flat, soft. No tenderness   Musculoskeletal: No edema bilaterally  Skin: Warm, dry, no lesions  Neurological: No focal deficits, normal gait  Psychiatric: Mood within normal limits    Assessment/Plan:    Problem List Items Addressed This Visit       Obesity (BMI 30-39.9)    Prediabetes    Hypertriglyceridemia     -Triglycerides 991  -Start fenofibrate.  Continue Lipitor 40 mg daily  -Stop alcohol  -decrease fat intake to less than 25% calories - nutrirtionist referral ordered  -Counseled on diet and exercise.  Patient is eating a lot of fried, " greasy foods.  He eats 2 hotdogs in the morning daily.  Advised to eat more fruits, vegetables can replace hotdogs with 2 egg whites in the morning is also to stop all alcohol, drinking sodas daily.  -lipid panel 1 month  -ER precautions given for risk of pancreatitis          Relevant Medications    fenofibrate (TRICOR) 145 MG Tab    Other Relevant Orders    Lipid Profile    Referral to Nutrition Services    Neck swelling     Patient reports that he has a sensation of his neck swelling and a choking sensation recently.  Reports that he has no trouble swallowing or breathing.  Last thyroid labs to normal limits last year    On exam no masses or nodules palpated  Ultrasound ordered          Relevant Orders    US-THYROID     Other Visit Diagnoses       Need for vaccination        Relevant Medications    fenofibrate (TRICOR) 145 MG Tab    Other Relevant Orders    Hepatitis B Vaccine Adult 20+             Return in about 4 weeks (around 11/3/2023), or if symptoms worsen or fail to improve.

## 2023-10-06 NOTE — ASSESSMENT & PLAN NOTE
Patient reports that he has a sensation of his neck swelling and a choking sensation recently.  Reports that he has no trouble swallowing or breathing.  Last thyroid labs to normal limits last year    On exam no masses or nodules palpated  Ultrasound ordered

## 2023-11-27 ENCOUNTER — HOSPITAL ENCOUNTER (OUTPATIENT)
Dept: LAB | Facility: MEDICAL CENTER | Age: 43
End: 2023-11-27
Attending: STUDENT IN AN ORGANIZED HEALTH CARE EDUCATION/TRAINING PROGRAM
Payer: COMMERCIAL

## 2023-11-27 DIAGNOSIS — E78.1 HYPERTRIGLYCERIDEMIA: ICD-10-CM

## 2023-11-27 LAB
CHOLEST SERPL-MCNC: 143 MG/DL (ref 100–199)
FASTING STATUS PATIENT QL REPORTED: NORMAL
HDLC SERPL-MCNC: 42 MG/DL
LDLC SERPL CALC-MCNC: 87 MG/DL
TRIGL SERPL-MCNC: 69 MG/DL (ref 0–149)

## 2023-11-27 PROCEDURE — 36415 COLL VENOUS BLD VENIPUNCTURE: CPT

## 2023-11-27 PROCEDURE — 80061 LIPID PANEL: CPT

## 2023-12-14 ENCOUNTER — OFFICE VISIT (OUTPATIENT)
Dept: MEDICAL GROUP | Facility: PHYSICIAN GROUP | Age: 43
End: 2023-12-14
Payer: COMMERCIAL

## 2023-12-14 VITALS
HEART RATE: 74 BPM | RESPIRATION RATE: 16 BRPM | BODY MASS INDEX: 33.5 KG/M2 | HEIGHT: 74 IN | DIASTOLIC BLOOD PRESSURE: 76 MMHG | TEMPERATURE: 97.5 F | OXYGEN SATURATION: 96 % | WEIGHT: 261.02 LBS | SYSTOLIC BLOOD PRESSURE: 134 MMHG

## 2023-12-14 DIAGNOSIS — Z23 NEED FOR VACCINATION: ICD-10-CM

## 2023-12-14 DIAGNOSIS — E78.1 HYPERTRIGLYCERIDEMIA: ICD-10-CM

## 2023-12-14 DIAGNOSIS — R73.03 PREDIABETES: ICD-10-CM

## 2023-12-14 PROCEDURE — 90746 HEPB VACCINE 3 DOSE ADULT IM: CPT | Performed by: STUDENT IN AN ORGANIZED HEALTH CARE EDUCATION/TRAINING PROGRAM

## 2023-12-14 PROCEDURE — 3078F DIAST BP <80 MM HG: CPT | Performed by: STUDENT IN AN ORGANIZED HEALTH CARE EDUCATION/TRAINING PROGRAM

## 2023-12-14 PROCEDURE — 99213 OFFICE O/P EST LOW 20 MIN: CPT | Mod: 25 | Performed by: STUDENT IN AN ORGANIZED HEALTH CARE EDUCATION/TRAINING PROGRAM

## 2023-12-14 PROCEDURE — 3075F SYST BP GE 130 - 139MM HG: CPT | Performed by: STUDENT IN AN ORGANIZED HEALTH CARE EDUCATION/TRAINING PROGRAM

## 2023-12-14 PROCEDURE — 90471 IMMUNIZATION ADMIN: CPT | Performed by: STUDENT IN AN ORGANIZED HEALTH CARE EDUCATION/TRAINING PROGRAM

## 2023-12-14 ASSESSMENT — FIBROSIS 4 INDEX: FIB4 SCORE: 0.99

## 2023-12-15 ASSESSMENT — ENCOUNTER SYMPTOMS
CHILLS: 0
FEVER: 0
COUGH: 0
DIZZINESS: 0
WHEEZING: 0
PALPITATIONS: 0
FOCAL WEAKNESS: 0
ORTHOPNEA: 0
HEADACHES: 0
SHORTNESS OF BREATH: 0

## 2023-12-15 NOTE — PROGRESS NOTES
HISTORY OF PRESENT ILLNESS: Bertin is a pleasant 43 y.o. male, established patient who presents today to discuss medical problems as listed below:    Problem   Hypertriglyceridemia    Patient here to follow up on triglycerides  He had a level of over 900 2 mnts ago. He has completely changed his diet and lost over 20lbs. He is also taking fenofibrate and lipitor    Labs today show:   Latest Reference Range & Units 11/27/23 08:18   Fasting Status  Fasting   Cholesterol,Tot 100 - 199 mg/dL 143   Triglycerides 0 - 149 mg/dL 69   HDL >=40 mg/dL 42   LDL <100 mg/dL 87           Current Outpatient Medications Ordered in Epic   Medication Sig Dispense Refill    fenofibrate (TRICOR) 145 MG Tab Take 1 Tablet by mouth every day. 60 Tablet 1    ONETOUCH VERIO strip USE 1 STRIP TO CHECK FASTING GLUCOSE ONCE DAILY IN THE MORNING 100 Strip 3    metoprolol tartrate (LOPRESSOR) 25 MG Tab TAKE 1 TABLET BY MOUTH TWICE DAILY 180 Tablet 3    atorvastatin (LIPITOR) 40 MG Tab Take 1 Tablet by mouth every day. 90 Tablet 3    Diclofenac Sodium 1 % Cream Apply 1 g topically 3 times a day as needed (foot pain). 120 g 3    Blood Glucose Meter Kit Test fasting blood sugar every am 1 Kit 0    Blood Glucose Test Strips Test fasting blood sugar every am 100 Strip 3    Lancets Use one lancet to test fasting blood sugar every am 100 Each 3    Alcohol Swabs Wipe site with prep pad prior to injection. 100 Each 0    Lancet Devices (LANCING DEVICE) Misc 1 Each every day. Use to measure fbs every am 1 Each 0    vitamin D2, Ergocalciferol, (DRISDOL) 1.25 MG (39323 UT) Cap capsule Take 1 Capsule by mouth every 7 days. Take on the same day of the week every week 12 Capsule 1     No current Epic-ordered facility-administered medications on file.       Review of systems:  Review of Systems   Constitutional:  Negative for chills and fever.   Respiratory:  Negative for cough, shortness of breath and wheezing.    Cardiovascular:  Negative for chest pain,  palpitations, orthopnea and leg swelling.   Genitourinary:  Negative for dysuria.   Musculoskeletal:  Negative for joint pain.   Neurological:  Negative for dizziness, focal weakness and headaches.         Patient Active Problem List    Diagnosis Date Noted    Hypertriglyceridemia 10/06/2023    Neck swelling 10/06/2023    Foot pain, bilateral 07/06/2022    Blurry vision, bilateral 05/10/2022    Prediabetes 02/14/2022    Leg pain, bilateral 02/14/2022    Encounter to establish care 02/14/2022    History of blurred vision 02/14/2022    Leg cramping 02/14/2022    Hypertension 02/14/2022    History of COVID-19 01/08/2021    PHYLICIA (obstructive sleep apnea) 07/29/2019    Impaired fasting glucose 03/05/2019    Obesity (BMI 30-39.9) 07/25/2017    Vitamin D deficiency disease 12/16/2013    Umbilical hernia 12/16/2013    Dyslipidemia 11/22/2011    Tobacco use disorder 11/06/2009     Past Surgical History:   Procedure Laterality Date    VASECTOMY       Social History     Tobacco Use    Smoking status: Some Days     Types: Cigarettes    Smokeless tobacco: Never    Tobacco comments:     2 every other day   Vaping Use    Vaping Use: Never used   Substance Use Topics    Alcohol use: Yes     Alcohol/week: 2.4 oz     Types: 4 Cans of beer per week     Comment: occasional    Drug use: No     Comment: in past he used marijuana,cocaine,methamphetamines and maybe crack, pt. unsure      Family History   Problem Relation Age of Onset    Diabetes Father     Diabetes Paternal Grandmother     Diabetes Paternal Grandfather      Current Outpatient Medications   Medication Sig Dispense Refill    fenofibrate (TRICOR) 145 MG Tab Take 1 Tablet by mouth every day. 60 Tablet 1    ONETOUCH VERIO strip USE 1 STRIP TO CHECK FASTING GLUCOSE ONCE DAILY IN THE MORNING 100 Strip 3    metoprolol tartrate (LOPRESSOR) 25 MG Tab TAKE 1 TABLET BY MOUTH TWICE DAILY 180 Tablet 3    atorvastatin (LIPITOR) 40 MG Tab Take 1 Tablet by mouth every day. 90 Tablet  "3    Diclofenac Sodium 1 % Cream Apply 1 g topically 3 times a day as needed (foot pain). 120 g 3    Blood Glucose Meter Kit Test fasting blood sugar every am 1 Kit 0    Blood Glucose Test Strips Test fasting blood sugar every am 100 Strip 3    Lancets Use one lancet to test fasting blood sugar every am 100 Each 3    Alcohol Swabs Wipe site with prep pad prior to injection. 100 Each 0    Lancet Devices (LANCING DEVICE) Misc 1 Each every day. Use to measure fbs every am 1 Each 0    vitamin D2, Ergocalciferol, (DRISDOL) 1.25 MG (98659 UT) Cap capsule Take 1 Capsule by mouth every 7 days. Take on the same day of the week every week 12 Capsule 1     No current facility-administered medications for this visit.       Allergies:  No Known Allergies    Allergies, past medical history, past surgical history, family history, social history reviewed and updated.    Objective:    /76 (BP Location: Left arm, Patient Position: Sitting, BP Cuff Size: Large adult)   Pulse 74   Temp 36.4 °C (97.5 °F) (Temporal)   Resp 16   Ht 1.88 m (6' 2\")   Wt 118 kg (261 lb 0.4 oz)   SpO2 96%   BMI 33.51 kg/m²    Body mass index is 33.51 kg/m².    Physical exam:  General: Normal appearance, no acute distress, not ill-appearing  HEENT: EOM intact, conjunctiva normal limits, negative right/left eye discharge.  Sclera anicteric  Cardiovascular: Normal rate and rhythm, no murmurs  Pulmonary: No respiratory distress, no wheezing, no rales, breath sounds normal.  Musculoskeletal: No edema bilaterally  Skin: Warm, dry, no lesions  Neurological: No focal deficits, normal gait  Psychiatric: Mood within normal limits    Assessment/Plan:    Problem List Items Addressed This Visit       Prediabetes    Relevant Orders    HEMOGLOBIN A1C    Hypertriglyceridemia     Great improvement on lipid panel  Positive reinforcement for life style changes and wt loss  He would like to come off the fenofibrate if he can. Advised to dc and we will recheck in 6 " months           Relevant Orders    Lipid Profile     Other Visit Diagnoses       Need for vaccination        Relevant Orders    Hepatitis B Vaccine Adult 20+ (Completed)             Return in about 6 months (around 6/14/2024), or if symptoms worsen or fail to improve.

## 2023-12-15 NOTE — ASSESSMENT & PLAN NOTE
Great improvement on lipid panel  Positive reinforcement for life style changes and wt loss  He would like to come off the fenofibrate if he can. Advised to dc and we will recheck in 6 months

## 2024-01-30 DIAGNOSIS — Z23 NEED FOR VACCINATION: ICD-10-CM

## 2024-01-30 NOTE — TELEPHONE ENCOUNTER
Requested Prescriptions     Pending Prescriptions Disp Refills    fenofibrate (TRICOR) 145 MG Tab [Pharmacy Med Name: Fenofibrate 145 MG Oral Tablet] 60 Tablet 0     Sig: Take 1 tablet by mouth once daily      Last office visit: 4/11/23  Last lab: 10/2/23

## 2024-01-31 RX ORDER — FENOFIBRATE 145 MG/1
145 TABLET, COATED ORAL DAILY
Qty: 90 TABLET | Refills: 3 | Status: SHIPPED | OUTPATIENT
Start: 2024-01-31

## 2024-03-16 ENCOUNTER — OFFICE VISIT (OUTPATIENT)
Dept: URGENT CARE | Facility: PHYSICIAN GROUP | Age: 44
End: 2024-03-16
Payer: COMMERCIAL

## 2024-03-16 VITALS
HEART RATE: 69 BPM | TEMPERATURE: 97.9 F | SYSTOLIC BLOOD PRESSURE: 122 MMHG | WEIGHT: 265 LBS | RESPIRATION RATE: 16 BRPM | DIASTOLIC BLOOD PRESSURE: 84 MMHG | BODY MASS INDEX: 34.01 KG/M2 | OXYGEN SATURATION: 96 % | HEIGHT: 74 IN

## 2024-03-16 DIAGNOSIS — H11.32 SUBCONJUNCTIVAL HEMORRHAGE, LEFT: ICD-10-CM

## 2024-03-16 PROCEDURE — 3074F SYST BP LT 130 MM HG: CPT | Performed by: FAMILY MEDICINE

## 2024-03-16 PROCEDURE — 3079F DIAST BP 80-89 MM HG: CPT | Performed by: FAMILY MEDICINE

## 2024-03-16 PROCEDURE — 99213 OFFICE O/P EST LOW 20 MIN: CPT | Performed by: FAMILY MEDICINE

## 2024-03-16 ASSESSMENT — VISUAL ACUITY
OD_CC: 20/15
OS_CC: 20/40

## 2024-03-16 ASSESSMENT — FIBROSIS 4 INDEX: FIB4 SCORE: 0.99

## 2024-03-16 ASSESSMENT — ENCOUNTER SYMPTOMS
EYE REDNESS: 1
FEVER: 0
EYE DISCHARGE: 0
BLURRED VISION: 0
DOUBLE VISION: 0
EYE PAIN: 0
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
## 2024-04-27 DIAGNOSIS — I10 ESSENTIAL HYPERTENSION: ICD-10-CM

## 2024-04-27 DIAGNOSIS — E78.5 DYSLIPIDEMIA: ICD-10-CM

## 2024-04-29 RX ORDER — ATORVASTATIN CALCIUM 40 MG/1
40 TABLET, FILM COATED ORAL DAILY
Qty: 90 TABLET | Refills: 3 | Status: SHIPPED | OUTPATIENT
Start: 2024-04-29

## 2024-04-29 NOTE — TELEPHONE ENCOUNTER
Requested Prescriptions     Pending Prescriptions Disp Refills    metoprolol tartrate (LOPRESSOR) 25 MG Tab [Pharmacy Med Name: Metoprolol Tartrate 25 MG Oral Tablet] 180 Tablet 0     Sig: Take 1 tablet by mouth twice daily    atorvastatin (LIPITOR) 40 MG Tab [Pharmacy Med Name: Atorvastatin Calcium 40 MG Oral Tablet] 90 Tablet 0     Sig: Take 1 tablet by mouth once daily      Last office visit: 4/11/23  Last lab: 11/27/23

## 2024-06-03 ENCOUNTER — DOCUMENTATION (OUTPATIENT)
Dept: HEALTH INFORMATION MANAGEMENT | Facility: OTHER | Age: 44
End: 2024-06-03
Payer: COMMERCIAL

## 2024-06-13 ENCOUNTER — APPOINTMENT (OUTPATIENT)
Dept: MEDICAL GROUP | Facility: PHYSICIAN GROUP | Age: 44
End: 2024-06-13
Payer: COMMERCIAL

## 2025-01-24 DIAGNOSIS — Z23 NEED FOR VACCINATION: ICD-10-CM

## 2025-01-24 RX ORDER — FENOFIBRATE 145 MG/1
145 TABLET, COATED ORAL DAILY
Qty: 90 TABLET | Refills: 3 | Status: SHIPPED | OUTPATIENT
Start: 2025-01-24

## 2025-04-24 DIAGNOSIS — E78.5 DYSLIPIDEMIA: ICD-10-CM

## 2025-04-24 DIAGNOSIS — I10 ESSENTIAL HYPERTENSION: ICD-10-CM

## 2025-04-29 ENCOUNTER — RESEARCH ENCOUNTER (OUTPATIENT)
Dept: RESEARCH | Facility: MEDICAL CENTER | Age: 45
End: 2025-04-29

## 2025-04-29 RX ORDER — ATORVASTATIN CALCIUM 40 MG/1
40 TABLET, FILM COATED ORAL DAILY
Qty: 100 TABLET | Refills: 3 | Status: SHIPPED | OUTPATIENT
Start: 2025-04-29

## 2025-04-29 RX ORDER — METOPROLOL TARTRATE 25 MG/1
25 TABLET, FILM COATED ORAL 2 TIMES DAILY
Qty: 200 TABLET | Refills: 3 | Status: SHIPPED | OUTPATIENT
Start: 2025-04-29

## 2025-04-29 NOTE — TELEPHONE ENCOUNTER
Received request via: Patient    Was the patient seen in the last year in this department? No    Does the patient have an active prescription (recently filled or refills available) for medication(s) requested? No    Pharmacy Name: [facility], [place]    Does the patient have prison Plus and need 100-day supply? (This applies to ALL medications) Patient does not have SCP    Last ov- 03/16/2024  Last labs-11/27/2023

## 2025-05-01 ENCOUNTER — OFFICE VISIT (OUTPATIENT)
Dept: URGENT CARE | Facility: PHYSICIAN GROUP | Age: 45
End: 2025-05-01
Payer: COMMERCIAL

## 2025-05-01 ENCOUNTER — TELEPHONE (OUTPATIENT)
Dept: MEDICAL GROUP | Facility: PHYSICIAN GROUP | Age: 45
End: 2025-05-01
Payer: COMMERCIAL

## 2025-05-01 VITALS
OXYGEN SATURATION: 98 % | DIASTOLIC BLOOD PRESSURE: 92 MMHG | HEART RATE: 85 BPM | WEIGHT: 262 LBS | TEMPERATURE: 97.5 F | RESPIRATION RATE: 18 BRPM | SYSTOLIC BLOOD PRESSURE: 132 MMHG | BODY MASS INDEX: 33.64 KG/M2

## 2025-05-01 DIAGNOSIS — I10 ESSENTIAL HYPERTENSION: ICD-10-CM

## 2025-05-01 DIAGNOSIS — Z00.00 HEALTH CARE MAINTENANCE: ICD-10-CM

## 2025-05-01 DIAGNOSIS — E78.5 DYSLIPIDEMIA: ICD-10-CM

## 2025-05-01 PROCEDURE — 3075F SYST BP GE 130 - 139MM HG: CPT | Performed by: NURSE PRACTITIONER

## 2025-05-01 PROCEDURE — 99214 OFFICE O/P EST MOD 30 MIN: CPT | Performed by: NURSE PRACTITIONER

## 2025-05-01 PROCEDURE — 3080F DIAST BP >= 90 MM HG: CPT | Performed by: NURSE PRACTITIONER

## 2025-05-01 RX ORDER — ATORVASTATIN CALCIUM 40 MG/1
40 TABLET, FILM COATED ORAL DAILY
Qty: 100 TABLET | Refills: 3 | Status: SHIPPED | OUTPATIENT
Start: 2025-05-01

## 2025-05-01 RX ORDER — METOPROLOL TARTRATE 25 MG/1
25 TABLET, FILM COATED ORAL 2 TIMES DAILY
Qty: 200 TABLET | Refills: 3 | Status: SHIPPED | OUTPATIENT
Start: 2025-05-01

## 2025-05-01 ASSESSMENT — FIBROSIS 4 INDEX: FIB4 SCORE: 1.01
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
## 2025-05-02 ENCOUNTER — HOSPITAL ENCOUNTER (OUTPATIENT)
Dept: LAB | Facility: MEDICAL CENTER | Age: 45
End: 2025-05-02
Attending: NURSE PRACTITIONER
Payer: COMMERCIAL

## 2025-05-02 DIAGNOSIS — Z00.00 HEALTH CARE MAINTENANCE: ICD-10-CM

## 2025-05-02 DIAGNOSIS — E78.5 DYSLIPIDEMIA: ICD-10-CM

## 2025-05-02 LAB
ALBUMIN SERPL BCP-MCNC: 4.2 G/DL (ref 3.2–4.9)
ALBUMIN/GLOB SERPL: 1.6 G/DL
ALP SERPL-CCNC: 71 U/L (ref 30–99)
ALT SERPL-CCNC: 37 U/L (ref 2–50)
ANION GAP SERPL CALC-SCNC: 7 MMOL/L (ref 7–16)
AST SERPL-CCNC: 28 U/L (ref 12–45)
BASOPHILS # BLD AUTO: 0.6 % (ref 0–1.8)
BASOPHILS # BLD: 0.04 K/UL (ref 0–0.12)
BILIRUB SERPL-MCNC: 0.3 MG/DL (ref 0.1–1.5)
BUN SERPL-MCNC: 24 MG/DL (ref 8–22)
CALCIUM ALBUM COR SERPL-MCNC: 8.8 MG/DL (ref 8.5–10.5)
CALCIUM SERPL-MCNC: 9 MG/DL (ref 8.5–10.5)
CHLORIDE SERPL-SCNC: 105 MMOL/L (ref 96–112)
CHOLEST SERPL-MCNC: 160 MG/DL (ref 100–199)
CO2 SERPL-SCNC: 27 MMOL/L (ref 20–33)
CREAT SERPL-MCNC: 1.21 MG/DL (ref 0.5–1.4)
EOSINOPHIL # BLD AUTO: 0.15 K/UL (ref 0–0.51)
EOSINOPHIL NFR BLD: 2.1 % (ref 0–6.9)
ERYTHROCYTE [DISTWIDTH] IN BLOOD BY AUTOMATED COUNT: 40.7 FL (ref 35.9–50)
FASTING STATUS PATIENT QL REPORTED: NORMAL
GFR SERPLBLD CREATININE-BSD FMLA CKD-EPI: 75 ML/MIN/1.73 M 2
GLOBULIN SER CALC-MCNC: 2.7 G/DL (ref 1.9–3.5)
GLUCOSE SERPL-MCNC: 116 MG/DL (ref 65–99)
HCT VFR BLD AUTO: 43.5 % (ref 42–52)
HDLC SERPL-MCNC: 42 MG/DL
HGB BLD-MCNC: 14.9 G/DL (ref 14–18)
IMM GRANULOCYTES # BLD AUTO: 0.02 K/UL (ref 0–0.11)
IMM GRANULOCYTES NFR BLD AUTO: 0.3 % (ref 0–0.9)
LDLC SERPL CALC-MCNC: 96 MG/DL
LYMPHOCYTES # BLD AUTO: 2.01 K/UL (ref 1–4.8)
LYMPHOCYTES NFR BLD: 28.2 % (ref 22–41)
MCH RBC QN AUTO: 31.1 PG (ref 27–33)
MCHC RBC AUTO-ENTMCNC: 34.3 G/DL (ref 32.3–36.5)
MCV RBC AUTO: 90.8 FL (ref 81.4–97.8)
MONOCYTES # BLD AUTO: 0.58 K/UL (ref 0–0.85)
MONOCYTES NFR BLD AUTO: 8.1 % (ref 0–13.4)
NEUTROPHILS # BLD AUTO: 4.32 K/UL (ref 1.82–7.42)
NEUTROPHILS NFR BLD: 60.7 % (ref 44–72)
NRBC # BLD AUTO: 0 K/UL
NRBC BLD-RTO: 0 /100 WBC (ref 0–0.2)
PLATELET # BLD AUTO: 233 K/UL (ref 164–446)
PMV BLD AUTO: 10.7 FL (ref 9–12.9)
POTASSIUM SERPL-SCNC: 4.2 MMOL/L (ref 3.6–5.5)
PROT SERPL-MCNC: 6.9 G/DL (ref 6–8.2)
RBC # BLD AUTO: 4.79 M/UL (ref 4.7–6.1)
SODIUM SERPL-SCNC: 139 MMOL/L (ref 135–145)
TRIGL SERPL-MCNC: 110 MG/DL (ref 0–149)
WBC # BLD AUTO: 7.1 K/UL (ref 4.8–10.8)

## 2025-05-02 PROCEDURE — 80061 LIPID PANEL: CPT

## 2025-05-02 PROCEDURE — 80053 COMPREHEN METABOLIC PANEL: CPT

## 2025-05-02 PROCEDURE — 36415 COLL VENOUS BLD VENIPUNCTURE: CPT

## 2025-05-02 PROCEDURE — 85025 COMPLETE CBC W/AUTO DIFF WBC: CPT

## 2025-05-12 ENCOUNTER — OFFICE VISIT (OUTPATIENT)
Dept: MEDICAL GROUP | Facility: CLINIC | Age: 45
End: 2025-05-12
Payer: COMMERCIAL

## 2025-05-12 VITALS
TEMPERATURE: 98.4 F | BODY MASS INDEX: 33.67 KG/M2 | SYSTOLIC BLOOD PRESSURE: 122 MMHG | RESPIRATION RATE: 16 BRPM | HEIGHT: 74 IN | OXYGEN SATURATION: 95 % | WEIGHT: 262.35 LBS | DIASTOLIC BLOOD PRESSURE: 76 MMHG | HEART RATE: 82 BPM

## 2025-05-12 DIAGNOSIS — R73.03 PREDIABETES: ICD-10-CM

## 2025-05-12 DIAGNOSIS — E66.9 OBESITY (BMI 30-39.9): ICD-10-CM

## 2025-05-12 DIAGNOSIS — R05.1 ACUTE COUGH: ICD-10-CM

## 2025-05-12 DIAGNOSIS — Z11.3 ROUTINE SCREENING FOR STI (SEXUALLY TRANSMITTED INFECTION): ICD-10-CM

## 2025-05-12 DIAGNOSIS — Z12.12 SCREENING FOR COLORECTAL CANCER: ICD-10-CM

## 2025-05-12 DIAGNOSIS — E55.9 VITAMIN D DEFICIENCY: ICD-10-CM

## 2025-05-12 DIAGNOSIS — E78.1 HYPERTRIGLYCERIDEMIA: ICD-10-CM

## 2025-05-12 DIAGNOSIS — I10 PRIMARY HYPERTENSION: ICD-10-CM

## 2025-05-12 DIAGNOSIS — Z12.11 SCREENING FOR COLORECTAL CANCER: ICD-10-CM

## 2025-05-12 DIAGNOSIS — E78.5 DYSLIPIDEMIA: ICD-10-CM

## 2025-05-12 DIAGNOSIS — Z12.5 PROSTATE CANCER SCREENING: ICD-10-CM

## 2025-05-12 DIAGNOSIS — R20.2 PARESTHESIA: ICD-10-CM

## 2025-05-12 PROCEDURE — 3078F DIAST BP <80 MM HG: CPT | Performed by: PHYSICIAN ASSISTANT

## 2025-05-12 PROCEDURE — 3074F SYST BP LT 130 MM HG: CPT | Performed by: PHYSICIAN ASSISTANT

## 2025-05-12 PROCEDURE — 99214 OFFICE O/P EST MOD 30 MIN: CPT | Performed by: PHYSICIAN ASSISTANT

## 2025-05-12 ASSESSMENT — PATIENT HEALTH QUESTIONNAIRE - PHQ9: CLINICAL INTERPRETATION OF PHQ2 SCORE: 0

## 2025-05-12 ASSESSMENT — FIBROSIS 4 INDEX: FIB4 SCORE: 0.91

## 2025-05-12 NOTE — LETTER
May 12, 2025       Patient: Bertin Zhu   YOB: 1980   Date of Visit: 5/12/2025         To Whom It May Concern:    In my medical opinion, I recommend that Bertin Zhu return to work on 5-13-25.    If you have any questions or concerns, please don't hesitate to call [phone]          Sincerely,          Rosangela Castañeda P.A.-C.  Electronically Signed

## 2025-05-12 NOTE — PROGRESS NOTES
cc:  establish care    Subjective:     Bertin Zhu is a 44 y.o. male presenting for establish care        History of Present Illness  The patient is a 44-year-old male who presents to the office today as a new patient, previously under the care of Cheryl Demucha, who is no longer with the practice. He presents indicating that he is having a cough.    He has been experiencing a cough for the past 2 weeks, initially accompanied by chest pain and pressure. Currently, he reports postnasal drip and mucus production during coughing episodes, but notes an overall improvement in his condition. He has attempted self-treatment with lemon tea and honey.    He also reports numbness in his feet, particularly after prolonged periods of sitting or driving, which necessitates frequent stops for rest. Additionally, he experiences a burning sensation in his feet during extended periods of walking, such as his 12-hour work shifts. Despite the use of insoles in his work boots, these symptoms persist bilaterally. He has previously undergone testing at a clinic, where he was informed of the need for cellular regeneration injections in his feet.    Recent lab results from 05/02/2025 show an elevated glucose level and an elevated BUN level. The rest of the metabolic panel is normal, and red and white blood cells are within normal ranges. Cholesterol levels are essentially negative.    He has expressed interest in undergoing a Cologuard test.    He has expressed interest in undergoing an HIV test, despite being in a monogamous relationship for several years. He also requested additional STI screenings, including tests for gonorrhea, chlamydia, HSV, syphilis, hepatitis B, and hepatitis C.    He has expressed interest in undergoing a PSA test.    He has expressed interest in undergoing a vitamin D test. He was previously on vitamin D 5000 IU daily.    PAST SURGICAL HISTORY:  He had a vasectomy done about 12 to 15 years ago.    "    Review of systems:  See above.   Denies any symptoms unless previously indicated.        Current Outpatient Medications:     atorvastatin (LIPITOR) 40 MG Tab, Take 1 Tablet by mouth every day., Disp: 100 Tablet, Rfl: 3    metoprolol tartrate (LOPRESSOR) 25 MG Tab, Take 1 Tablet by mouth 2 times a day., Disp: 200 Tablet, Rfl: 3    fenofibrate (TRICOR) 145 MG Tab, Take 1 Tablet by mouth every day., Disp: 90 Tablet, Rfl: 3    ONETOUCH VERIO strip, USE 1 STRIP TO CHECK FASTING GLUCOSE ONCE DAILY IN THE MORNING, Disp: 100 Strip, Rfl: 3    Blood Glucose Meter Kit, Test fasting blood sugar every am, Disp: 1 Kit, Rfl: 0    Blood Glucose Test Strips, Test fasting blood sugar every am, Disp: 100 Strip, Rfl: 3    Lancets, Use one lancet to test fasting blood sugar every am, Disp: 100 Each, Rfl: 3    Alcohol Swabs, Wipe site with prep pad prior to injection., Disp: 100 Each, Rfl: 0    Lancet Devices (LANCING DEVICE) Misc, 1 Each every day. Use to measure fbs every am, Disp: 1 Each, Rfl: 0    vitamin D2, Ergocalciferol, (DRISDOL) 1.25 MG (40443 UT) Cap capsule, Take 1 Capsule by mouth every 7 days. Take on the same day of the week every week, Disp: 12 Capsule, Rfl: 1    Allergies, past medical history, past surgical history, family history, social history reviewed and updated    Objective:     Vitals: /76 (BP Location: Left arm, Patient Position: Sitting, BP Cuff Size: Adult long)   Pulse 82   Temp 36.9 °C (98.4 °F) (Temporal)   Resp 16   Ht 1.88 m (6' 2\")   Wt 119 kg (262 lb 5.6 oz)   SpO2 95%   BMI 33.68 kg/m²   General: Alert, pleasant, NAD  EYES:   PERRL, EOMI, no icterus or pallor.  Conjunctivae and lids normal.   HENT:  Normocephalic.  External ears normal.   Neck supple.difficulty seeing nasal drainage  Heart: Regular rate and rhythm.  S1 and S2 normal.  No murmurs appreciated.  Respiratory: Normal respiratory effort.  Clear to auscultation bilaterally.  Abdomen: obese  Skin: Warm, dry, no " rashes.  Musculoskeletal: Gait is normal.  Moves all extremities well.    Extremities: normal range of motion all extremities.   Neurological: No tremors, sensation grossly intact, CN2-12 intact.  Psych:  Affect/mood is normal, judgement is good, memory is intact, grooming is appropriate.    Physical Exam  Mouth/Throat: Mucous membranes moist, no erythema, no exudate  Respiratory: Clear to auscultation, no wheezing, rales or rhonchi  Cardiovascular: Regular rate and rhythm, no murmurs, rubs, or gallops       Results  Labs   - Glucose Level: 05/02/2025, Elevated   - BUN Level: 05/02/2025, Elevated   - Metabolic Panel: 05/02/2025, Normal   - Red Blood Cells: 05/02/2025, Normal   - White Blood Cells: 05/02/2025, Normal   - Cholesterol: 05/02/2025, Essentially negative      Latest Reference Range & Units 05/02/25 08:10   WBC 4.8 - 10.8 K/uL 7.1   RBC 4.70 - 6.10 M/uL 4.79   Hemoglobin 14.0 - 18.0 g/dL 14.9   Hematocrit 42.0 - 52.0 % 43.5   MCV 81.4 - 97.8 fL 90.8   MCH 27.0 - 33.0 pg 31.1   MCHC 32.3 - 36.5 g/dL 34.3   RDW 35.9 - 50.0 fL 40.7   Platelet Count 164 - 446 K/uL 233   MPV 9.0 - 12.9 fL 10.7   Neutrophils-Polys 44.00 - 72.00 % 60.70   Neutrophils (Absolute) 1.82 - 7.42 K/uL 4.32   Lymphocytes 22.00 - 41.00 % 28.20   Lymphs (Absolute) 1.00 - 4.80 K/uL 2.01   Monocytes 0.00 - 13.40 % 8.10   Monos (Absolute) 0.00 - 0.85 K/uL 0.58   Eosinophils 0.00 - 6.90 % 2.10   Eos (Absolute) 0.00 - 0.51 K/uL 0.15   Basophils 0.00 - 1.80 % 0.60   Baso (Absolute) 0.00 - 0.12 K/uL 0.04   Immature Granulocytes 0.00 - 0.90 % 0.30   Immature Granulocytes (abs) 0.00 - 0.11 K/uL 0.02   Nucleated RBC 0.00 - 0.20 /100 WBC 0.00   NRBC (Absolute) K/uL 0.00   Sodium 135 - 145 mmol/L 139   Potassium 3.6 - 5.5 mmol/L 4.2   Chloride 96 - 112 mmol/L 105   Co2 20 - 33 mmol/L 27   Anion Gap 7.0 - 16.0  7.0   Glucose 65 - 99 mg/dL 116 (H)   Bun 8 - 22 mg/dL 24 (H)   Creatinine 0.50 - 1.40 mg/dL 1.21   GFR (CKD-EPI) >60 mL/min/1.73 m 2 75    Calcium 8.5 - 10.5 mg/dL 9.0   Correct Calcium 8.5 - 10.5 mg/dL 8.8   AST(SGOT) 12 - 45 U/L 28   ALT(SGPT) 2 - 50 U/L 37   Alkaline Phosphatase 30 - 99 U/L 71   Total Bilirubin 0.1 - 1.5 mg/dL 0.3   Albumin 3.2 - 4.9 g/dL 4.2   Total Protein 6.0 - 8.2 g/dL 6.9   Globulin 1.9 - 3.5 g/dL 2.7   A-G Ratio g/dL 1.6   Fasting Status  Fasting   Cholesterol,Tot 100 - 199 mg/dL 160   Triglycerides 0 - 149 mg/dL 110   HDL >=40 mg/dL 42   LDL <100 mg/dL 96   (H): Data is abnormally high    Assessment/Plan:     Bertin was seen today for establish care, cough and numbness.    Diagnoses and all orders for this visit:    Acute cough    Dyslipidemia    Vitamin D deficiency disease    Primary hypertension    Hypertriglyceridemia    Obesity (BMI 30-39.9)  -     Comp Metabolic Panel; Future    Prediabetes  -     Comp Metabolic Panel; Future  -     HEMOGLOBIN A1C; Future    Paresthesia  -     DX-LUMBAR SPINE-2 OR 3 VIEWS; Future    Routine screening for STI (sexually transmitted infection)  -     HIV AG/AB COMBO ASSAY DIAGNOSTIC; Future  -     T.PALLIDUM AB OTONIEL (SCREENING); Future  -     HEP B CORE AB TOTAL; Future  -     HEP C VIRUS ANTIBODY; Future  -     Chlamydia/GC, PCR (Urine); Future  -     TRICHOMONAS CULTURE    Prostate cancer screening  -     PROSTATE SPECIFIC AG SCREENING; Future    Screening for colorectal cancer  -     Referral to GI for Colonoscopy        Assessment & Plan  1. Acute cough.  - He has been experiencing a cough for the past 2 weeks, initially accompanied by chest pain and pressure.  - Currently, he reports postnasal drip and mucus production during coughing episodes, but notes an overall improvement in his condition.  - Discussed medication to help with postnasal drip. He declined at this time indicating that he is not wanting any medication.  - He will let us know if symptoms persist.    2. Paresthesia.  - There is a possible neuropathy and potential concern with the lumbar spine.  - Symptoms include  numbness when sitting for long periods and burning sensation when walking.  - A lumbar x-ray will be obtained to evaluate further.  - Follow-up in 3 months.    3. STI screening.  - Lab work ordered per patient request.  - Tests include gonorrhea, chlamydia, HIV, HSV, syphilis, hepatitis B, and hepatitis C.  - Urine tests for gonorrhea and chlamydia.  - Follow-up in 3 months.    4. Prostate cancer screening.  - Screening ordered.  - PSA test recommended.  - Follow-up in 3 months.    5. Colorectal cancer screening.  - Screening ordered.  - Colonoscopy referral placed.  - Follow-up in 3 months.    6. Dyslipidemia.  - Labs drawn on 05/02/2025 showed normal cholesterol levels.  - Labs will be repeated in the next 3 months.  - Medications previously filled by urgent care.  - Follow-up in 3 months.    7. Vitamin D deficiency.  - Labs drawn on 05/02/2025 showed a history of low vitamin D.  - Labs will be repeated in the next 3 months.  - Medications previously filled by urgent care.  - Follow-up in 3 months.    8. Hypertension.  - Labs drawn on 05/02/2025 showed elevated glucose and BUN levels.  - Labs will be repeated in the next 3 months.  - Medications previously filled by urgent care.  - Follow-up in 3 months.    9. Hypertriglyceridemia.  - Labs drawn on 05/02/2025 showed elevated glucose and BUN levels.  - Labs will be repeated in the next 3 months.  - Medications previously filled by urgent care.  - Follow-up in 3 months.    10. Obesity.  - Labs drawn on 05/02/2025 showed elevated glucose and BUN levels.  - Labs will be repeated in the next 3 months.  - Medications previously filled by urgent care.  - Follow-up in 3 months.    11. Prediabetes.  - Labs drawn on 05/02/2025 showed elevated glucose levels.  - Labs will be repeated in the next 3 months.  - Medications previously filled by urgent care.  - Follow-up in 3 months.    Return in about 3 months (around 8/12/2025), or if symptoms worsen or fail to improve, for  follow up labs and xray.    Please note that this dictation was created using voice recognition software. I have made every reasonable attempt to correct obvious errors, but expect that there are errors of grammar and possible content that I did not discover before finalizing note.

## 2025-05-16 NOTE — Clinical Note
REFERRAL APPROVAL NOTICE         Sent on May 16, 2025                   Bertin Zhu  [address]  [place] [address]                   Dear Mr. Trip Zhu,    After a careful review of the medical information and benefit coverage, [facility] has processed your referral. See below for additional details.    If applicable, you must be actively enrolled with your insurance for coverage of the authorized service. If you have any questions regarding your coverage, please contact your insurance directly.    REFERRAL INFORMATION   Referral #:  [id number]  Referred-To Provider    Referred-By Provider:  Gastroenterology    Rosangela Castañeda P.A.-C.   [facility]      [address] [id number] [phone] [address] [place] [id number]  [phone]    Referral Start Date:  05/12/2025  Referral End Date:   05/12/2026             SCHEDULING  If you do not already have an appointment, please call [phone] to make an appointment.     MORE INFORMATION  If you do not already have a [employer] account, sign up at: [email or web address]  You can access your medical information, make appointments, see lab results, billing information, and more.  If you have questions regarding this referral, please contact  the [facility] Referrals department at:             [phone]. Monday - Friday 8:00AM - 5:00PM.     Sincerely,    [facility]

## 2025-06-09 ENCOUNTER — HOSPITAL ENCOUNTER (OUTPATIENT)
Dept: LAB | Facility: MEDICAL CENTER | Age: 45
End: 2025-06-09
Attending: PHYSICIAN ASSISTANT
Payer: COMMERCIAL

## 2025-06-09 DIAGNOSIS — R73.03 PREDIABETES: ICD-10-CM

## 2025-06-09 DIAGNOSIS — Z11.3 ROUTINE SCREENING FOR STI (SEXUALLY TRANSMITTED INFECTION): ICD-10-CM

## 2025-06-09 DIAGNOSIS — Z12.5 PROSTATE CANCER SCREENING: ICD-10-CM

## 2025-06-09 DIAGNOSIS — E66.9 OBESITY (BMI 30-39.9): ICD-10-CM

## 2025-06-09 LAB
EST. AVERAGE GLUCOSE BLD GHB EST-MCNC: 126 MG/DL
HBA1C MFR BLD: 6 % (ref 4–5.6)

## 2025-06-09 PROCEDURE — 86803 HEPATITIS C AB TEST: CPT

## 2025-06-09 PROCEDURE — 86780 TREPONEMA PALLIDUM: CPT

## 2025-06-09 PROCEDURE — G0475 HIV COMBINATION ASSAY: HCPCS

## 2025-06-09 PROCEDURE — 87591 N.GONORRHOEAE DNA AMP PROB: CPT

## 2025-06-09 PROCEDURE — 80053 COMPREHEN METABOLIC PANEL: CPT

## 2025-06-09 PROCEDURE — 83036 HEMOGLOBIN GLYCOSYLATED A1C: CPT

## 2025-06-09 PROCEDURE — 84153 ASSAY OF PSA TOTAL: CPT

## 2025-06-09 PROCEDURE — 87491 CHLMYD TRACH DNA AMP PROBE: CPT

## 2025-06-09 PROCEDURE — 86704 HEP B CORE ANTIBODY TOTAL: CPT

## 2025-06-09 PROCEDURE — 36415 COLL VENOUS BLD VENIPUNCTURE: CPT

## 2025-06-10 ENCOUNTER — RESULTS FOLLOW-UP (OUTPATIENT)
Dept: MEDICAL GROUP | Facility: CLINIC | Age: 45
End: 2025-06-10

## 2025-06-10 LAB
ALBUMIN SERPL BCP-MCNC: 4.3 G/DL (ref 3.2–4.9)
ALBUMIN/GLOB SERPL: 1.5 G/DL
ALP SERPL-CCNC: 72 U/L (ref 30–99)
ALT SERPL-CCNC: 33 U/L (ref 2–50)
ANION GAP SERPL CALC-SCNC: 11 MMOL/L (ref 7–16)
AST SERPL-CCNC: 26 U/L (ref 12–45)
BILIRUB SERPL-MCNC: 0.4 MG/DL (ref 0.1–1.5)
BUN SERPL-MCNC: 23 MG/DL (ref 8–22)
C TRACH DNA SPEC QL NAA+PROBE: NEGATIVE
CALCIUM ALBUM COR SERPL-MCNC: 8.8 MG/DL (ref 8.5–10.5)
CALCIUM SERPL-MCNC: 9 MG/DL (ref 8.5–10.5)
CHLORIDE SERPL-SCNC: 107 MMOL/L (ref 96–112)
CO2 SERPL-SCNC: 21 MMOL/L (ref 20–33)
CREAT SERPL-MCNC: 1.17 MG/DL (ref 0.5–1.4)
GFR SERPLBLD CREATININE-BSD FMLA CKD-EPI: 78 ML/MIN/1.73 M 2
GLOBULIN SER CALC-MCNC: 2.8 G/DL (ref 1.9–3.5)
GLUCOSE SERPL-MCNC: 111 MG/DL (ref 65–99)
HBV CORE AB SERPL QL IA: NONREACTIVE
HCV AB SER QL: NORMAL
HIV 1+2 AB+HIV1 P24 AG SERPL QL IA: NORMAL
N GONORRHOEA DNA SPEC QL NAA+PROBE: NEGATIVE
POTASSIUM SERPL-SCNC: 3.8 MMOL/L (ref 3.6–5.5)
PROT SERPL-MCNC: 7.1 G/DL (ref 6–8.2)
PSA SERPL DL<=0.01 NG/ML-MCNC: 0.31 NG/ML (ref 0–4)
SODIUM SERPL-SCNC: 139 MMOL/L (ref 135–145)
SPECIMEN SOURCE: NORMAL
T PALLIDUM AB SER QL IA: NORMAL

## 2025-07-28 DIAGNOSIS — I10 ESSENTIAL HYPERTENSION: ICD-10-CM

## 2025-07-28 DIAGNOSIS — E78.5 DYSLIPIDEMIA: ICD-10-CM

## 2025-07-28 RX ORDER — ATORVASTATIN CALCIUM 40 MG/1
40 TABLET, FILM COATED ORAL DAILY
Qty: 90 TABLET | Refills: 2 | Status: SHIPPED | OUTPATIENT
Start: 2025-07-28

## 2025-07-28 RX ORDER — METOPROLOL TARTRATE 25 MG/1
25 TABLET, FILM COATED ORAL 2 TIMES DAILY
Qty: 180 TABLET | Refills: 2 | Status: SHIPPED | OUTPATIENT
Start: 2025-07-28

## 2025-07-28 NOTE — TELEPHONE ENCOUNTER
Received request via: Patient    Was the patient seen in the last year in this department? Yes    Does the patient have an active prescription (recently filled or refills available) for medication(s) requested? No    [facility]       Does the patient have prison Plus and need 100-day supply? (This applies to ALL medications) Patient does not have SCP

## 2025-07-30 DIAGNOSIS — Z23 NEED FOR VACCINATION: ICD-10-CM

## 2025-07-30 RX ORDER — FENOFIBRATE 145 MG/1
145 TABLET, FILM COATED ORAL DAILY
Qty: 90 TABLET | Refills: 3 | Status: SHIPPED | OUTPATIENT
Start: 2025-07-30

## 2025-07-30 NOTE — TELEPHONE ENCOUNTER
Received request via: Patient    Was the patient seen in the last year in this department? Yes    Does the patient have an active prescription (recently filled or refills available) for medication(s) requested? No    Pharmacy Name: [facility] [place]    Does the patient have half-way Plus and need 100-day supply? (This applies to ALL medications) Patient does not have SCP

## 2025-08-08 ENCOUNTER — OFFICE VISIT (OUTPATIENT)
Dept: URGENT CARE | Facility: PHYSICIAN GROUP | Age: 45
End: 2025-08-08
Payer: COMMERCIAL

## 2025-08-08 ENCOUNTER — NON-PROVIDER VISIT (OUTPATIENT)
Dept: URGENT CARE | Facility: PHYSICIAN GROUP | Age: 45
End: 2025-08-08
Payer: COMMERCIAL

## 2025-08-08 ENCOUNTER — APPOINTMENT (OUTPATIENT)
Dept: RADIOLOGY | Facility: IMAGING CENTER | Age: 45
End: 2025-08-08
Attending: PHYSICIAN ASSISTANT
Payer: COMMERCIAL

## 2025-08-08 DIAGNOSIS — R20.2 PARESTHESIA: ICD-10-CM

## 2025-08-08 PROCEDURE — 72100 X-RAY EXAM L-S SPINE 2/3 VWS: CPT | Mod: TC,FY | Performed by: RADIOLOGY

## 2025-08-12 ENCOUNTER — OFFICE VISIT (OUTPATIENT)
Dept: MEDICAL GROUP | Facility: CLINIC | Age: 45
End: 2025-08-12
Payer: COMMERCIAL

## 2025-08-12 VITALS
WEIGHT: 267.86 LBS | HEIGHT: 74 IN | TEMPERATURE: 98.1 F | SYSTOLIC BLOOD PRESSURE: 130 MMHG | RESPIRATION RATE: 20 BRPM | OXYGEN SATURATION: 98 % | HEART RATE: 65 BPM | DIASTOLIC BLOOD PRESSURE: 64 MMHG | BODY MASS INDEX: 34.38 KG/M2

## 2025-08-12 DIAGNOSIS — R20.0 NUMBNESS OF UPPER EXTREMITY: ICD-10-CM

## 2025-08-12 DIAGNOSIS — M47.819 FACET ARTHROPATHY: ICD-10-CM

## 2025-08-12 DIAGNOSIS — G47.33 OSA (OBSTRUCTIVE SLEEP APNEA): ICD-10-CM

## 2025-08-12 DIAGNOSIS — R73.03 PREDIABETES: Primary | ICD-10-CM

## 2025-08-12 DIAGNOSIS — E78.1 HYPERTRIGLYCERIDEMIA: ICD-10-CM

## 2025-08-12 DIAGNOSIS — E55.9 VITAMIN D DEFICIENCY: ICD-10-CM

## 2025-08-12 DIAGNOSIS — E78.5 DYSLIPIDEMIA: ICD-10-CM

## 2025-08-12 DIAGNOSIS — M51.362 DEGENERATION OF INTERVERTEBRAL DISC OF LUMBAR REGION WITH DISCOGENIC BACK PAIN AND LOWER EXTREMITY PAIN: ICD-10-CM

## 2025-08-12 PROCEDURE — 3078F DIAST BP <80 MM HG: CPT | Performed by: PHYSICIAN ASSISTANT

## 2025-08-12 PROCEDURE — 3075F SYST BP GE 130 - 139MM HG: CPT | Performed by: PHYSICIAN ASSISTANT

## 2025-08-12 PROCEDURE — 99214 OFFICE O/P EST MOD 30 MIN: CPT | Performed by: PHYSICIAN ASSISTANT

## 2025-08-12 ASSESSMENT — FIBROSIS 4 INDEX: FIB4 SCORE: 0.89
